# Patient Record
Sex: MALE | Race: WHITE | Employment: OTHER
[De-identification: names, ages, dates, MRNs, and addresses within clinical notes are randomized per-mention and may not be internally consistent; named-entity substitution may affect disease eponyms.]

---

## 2017-01-06 ENCOUNTER — CARE COORDINATION (OUTPATIENT)
Dept: CASE MANAGEMENT | Age: 82
End: 2017-01-06

## 2017-01-13 ENCOUNTER — CARE COORDINATION (OUTPATIENT)
Dept: CASE MANAGEMENT | Age: 82
End: 2017-01-13

## 2017-01-20 ENCOUNTER — CARE COORDINATION (OUTPATIENT)
Dept: CASE MANAGEMENT | Age: 82
End: 2017-01-20

## 2017-01-26 ENCOUNTER — CARE COORDINATION (OUTPATIENT)
Dept: CASE MANAGEMENT | Age: 82
End: 2017-01-26

## 2017-02-01 ENCOUNTER — OFFICE VISIT (OUTPATIENT)
Dept: ORTHOPEDIC SURGERY | Age: 82
End: 2017-02-01

## 2017-02-01 VITALS — BODY MASS INDEX: 24.5 KG/M2 | HEIGHT: 74 IN | WEIGHT: 190.92 LBS

## 2017-02-01 DIAGNOSIS — Z96.651 STATUS POST TOTAL RIGHT KNEE REPLACEMENT: Primary | ICD-10-CM

## 2017-02-01 PROCEDURE — 99024 POSTOP FOLLOW-UP VISIT: CPT | Performed by: ORTHOPAEDIC SURGERY

## 2017-02-06 ENCOUNTER — CARE COORDINATION (OUTPATIENT)
Dept: CASE MANAGEMENT | Age: 82
End: 2017-02-06

## 2017-02-22 ENCOUNTER — CARE COORDINATION (OUTPATIENT)
Dept: CASE MANAGEMENT | Age: 82
End: 2017-02-22

## 2018-02-15 ENCOUNTER — INITIAL CONSULT (OUTPATIENT)
Dept: SURGERY | Age: 83
End: 2018-02-15

## 2018-02-15 VITALS
SYSTOLIC BLOOD PRESSURE: 124 MMHG | WEIGHT: 187 LBS | DIASTOLIC BLOOD PRESSURE: 80 MMHG | HEIGHT: 74 IN | BODY MASS INDEX: 24 KG/M2

## 2018-02-15 DIAGNOSIS — K44.9 HIATAL HERNIA: Primary | ICD-10-CM

## 2018-02-15 DIAGNOSIS — R06.09 DYSPNEA ON EXERTION: ICD-10-CM

## 2018-02-15 PROCEDURE — 99203 OFFICE O/P NEW LOW 30 MIN: CPT | Performed by: SURGERY

## 2018-02-15 PROCEDURE — G8484 FLU IMMUNIZE NO ADMIN: HCPCS | Performed by: SURGERY

## 2018-02-15 PROCEDURE — G8420 CALC BMI NORM PARAMETERS: HCPCS | Performed by: SURGERY

## 2018-02-15 PROCEDURE — 1123F ACP DISCUSS/DSCN MKR DOCD: CPT | Performed by: SURGERY

## 2018-02-15 PROCEDURE — 4040F PNEUMOC VAC/ADMIN/RCVD: CPT | Performed by: SURGERY

## 2018-02-15 PROCEDURE — 1036F TOBACCO NON-USER: CPT | Performed by: SURGERY

## 2018-02-15 PROCEDURE — G8427 DOCREV CUR MEDS BY ELIG CLIN: HCPCS | Performed by: SURGERY

## 2018-02-15 NOTE — PROGRESS NOTES
intrinsic pulmonary factor rather than related to the hiatal hernia. Given his age, I think even repairing the hiatal hernia in a minimally invasive fashion would entail a very high risk to benefit ratio. Therefore, I would not recommend surgery at this time.

## 2018-04-16 ENCOUNTER — TELEPHONE (OUTPATIENT)
Dept: PULMONOLOGY | Age: 83
End: 2018-04-16

## 2018-04-20 ENCOUNTER — OFFICE VISIT (OUTPATIENT)
Dept: PULMONOLOGY | Age: 83
End: 2018-04-20

## 2018-04-20 VITALS
BODY MASS INDEX: 24.13 KG/M2 | RESPIRATION RATE: 24 BRPM | SYSTOLIC BLOOD PRESSURE: 150 MMHG | WEIGHT: 188 LBS | HEART RATE: 103 BPM | HEIGHT: 74 IN | OXYGEN SATURATION: 94 % | DIASTOLIC BLOOD PRESSURE: 94 MMHG

## 2018-04-20 DIAGNOSIS — I50.20 SYSTOLIC CONGESTIVE HEART FAILURE, UNSPECIFIED CONGESTIVE HEART FAILURE CHRONICITY: ICD-10-CM

## 2018-04-20 DIAGNOSIS — R06.02 SOB (SHORTNESS OF BREATH): Primary | ICD-10-CM

## 2018-04-20 DIAGNOSIS — K44.9 DIAPHRAGMATIC HERNIA WITHOUT OBSTRUCTION AND WITHOUT GANGRENE: ICD-10-CM

## 2018-04-20 PROCEDURE — G8420 CALC BMI NORM PARAMETERS: HCPCS | Performed by: INTERNAL MEDICINE

## 2018-04-20 PROCEDURE — 4040F PNEUMOC VAC/ADMIN/RCVD: CPT | Performed by: INTERNAL MEDICINE

## 2018-04-20 PROCEDURE — 1036F TOBACCO NON-USER: CPT | Performed by: INTERNAL MEDICINE

## 2018-04-20 PROCEDURE — 1123F ACP DISCUSS/DSCN MKR DOCD: CPT | Performed by: INTERNAL MEDICINE

## 2018-04-20 PROCEDURE — G8427 DOCREV CUR MEDS BY ELIG CLIN: HCPCS | Performed by: INTERNAL MEDICINE

## 2018-04-20 PROCEDURE — 99204 OFFICE O/P NEW MOD 45 MIN: CPT | Performed by: INTERNAL MEDICINE

## 2018-04-20 RX ORDER — FUROSEMIDE 20 MG/1
20 TABLET ORAL DAILY
Qty: 10 TABLET | Refills: 0 | Status: SHIPPED | OUTPATIENT
Start: 2018-04-20 | End: 2018-04-26 | Stop reason: SDUPTHER

## 2018-04-26 ENCOUNTER — OFFICE VISIT (OUTPATIENT)
Dept: CARDIOLOGY CLINIC | Age: 83
End: 2018-04-26

## 2018-04-26 VITALS
OXYGEN SATURATION: 96 % | BODY MASS INDEX: 23.93 KG/M2 | HEART RATE: 86 BPM | SYSTOLIC BLOOD PRESSURE: 138 MMHG | HEIGHT: 74 IN | WEIGHT: 186.5 LBS | DIASTOLIC BLOOD PRESSURE: 70 MMHG

## 2018-04-26 DIAGNOSIS — K44.9 DIAPHRAGMATIC HERNIA WITHOUT OBSTRUCTION AND WITHOUT GANGRENE: ICD-10-CM

## 2018-04-26 DIAGNOSIS — I10 ESSENTIAL HYPERTENSION: Chronic | ICD-10-CM

## 2018-04-26 DIAGNOSIS — I49.3 MULTIFOCAL PVCS: ICD-10-CM

## 2018-04-26 DIAGNOSIS — I50.42 CHRONIC COMBINED SYSTOLIC AND DIASTOLIC CONGESTIVE HEART FAILURE (HCC): Primary | ICD-10-CM

## 2018-04-26 DIAGNOSIS — I42.9 CARDIOMYOPATHY, UNSPECIFIED TYPE (HCC): ICD-10-CM

## 2018-04-26 PROCEDURE — G8427 DOCREV CUR MEDS BY ELIG CLIN: HCPCS | Performed by: INTERNAL MEDICINE

## 2018-04-26 PROCEDURE — G8420 CALC BMI NORM PARAMETERS: HCPCS | Performed by: INTERNAL MEDICINE

## 2018-04-26 PROCEDURE — 93000 ELECTROCARDIOGRAM COMPLETE: CPT | Performed by: INTERNAL MEDICINE

## 2018-04-26 PROCEDURE — 93225 XTRNL ECG REC<48 HRS REC: CPT | Performed by: INTERNAL MEDICINE

## 2018-04-26 PROCEDURE — 99205 OFFICE O/P NEW HI 60 MIN: CPT | Performed by: INTERNAL MEDICINE

## 2018-04-26 RX ORDER — LISINOPRIL 5 MG/1
5 TABLET ORAL DAILY
Qty: 30 TABLET | Refills: 5 | Status: SHIPPED | OUTPATIENT
Start: 2018-04-26 | End: 2018-07-06 | Stop reason: SDUPTHER

## 2018-04-26 RX ORDER — FUROSEMIDE 20 MG/1
20 TABLET ORAL 2 TIMES DAILY
Qty: 60 TABLET | Refills: 5 | Status: SHIPPED | OUTPATIENT
Start: 2018-04-26 | End: 2018-08-03 | Stop reason: SDUPTHER

## 2018-04-26 RX ORDER — METOPROLOL SUCCINATE 25 MG/1
25 TABLET, EXTENDED RELEASE ORAL DAILY
Qty: 30 TABLET | Refills: 5 | Status: ON HOLD | OUTPATIENT
Start: 2018-04-26 | End: 2018-06-09 | Stop reason: HOSPADM

## 2018-04-30 ENCOUNTER — TELEPHONE (OUTPATIENT)
Dept: CARDIOLOGY CLINIC | Age: 83
End: 2018-04-30

## 2018-05-07 ENCOUNTER — TELEPHONE (OUTPATIENT)
Dept: CARDIOLOGY CLINIC | Age: 83
End: 2018-05-07

## 2018-05-07 ENCOUNTER — HOSPITAL ENCOUNTER (OUTPATIENT)
Dept: NON INVASIVE DIAGNOSTICS | Age: 83
Discharge: HOME | End: 2018-05-07
Attending: INTERNAL MEDICINE | Admitting: INTERNAL MEDICINE

## 2018-05-07 VITALS — TEMPERATURE: 98.4 F

## 2018-05-07 DIAGNOSIS — I42.9 CARDIOMYOPATHY (HCC): ICD-10-CM

## 2018-05-07 LAB
LV EF: 27 %
LVEF MODALITY: NORMAL

## 2018-05-07 ASSESSMENT — PAIN - FUNCTIONAL ASSESSMENT
PAIN_FUNCTIONAL_ASSESSMENT: 0-10
PAIN_FUNCTIONAL_ASSESSMENT: 0-10

## 2018-05-10 PROCEDURE — 93227 XTRNL ECG REC<48 HR R&I: CPT | Performed by: INTERNAL MEDICINE

## 2018-05-11 ENCOUNTER — TELEPHONE (OUTPATIENT)
Dept: CARDIOLOGY CLINIC | Age: 83
End: 2018-05-11

## 2018-05-14 DIAGNOSIS — I49.3 MULTIFOCAL PVCS: ICD-10-CM

## 2018-05-31 ENCOUNTER — HOSPITAL ENCOUNTER (OUTPATIENT)
Dept: CT IMAGING | Age: 83
Discharge: HOME | End: 2018-05-31
Attending: INTERNAL MEDICINE | Admitting: INTERNAL MEDICINE

## 2018-05-31 ENCOUNTER — OFFICE VISIT (OUTPATIENT)
Dept: PULMONOLOGY | Age: 83
End: 2018-05-31

## 2018-05-31 VITALS
BODY MASS INDEX: 24 KG/M2 | RESPIRATION RATE: 24 BRPM | WEIGHT: 187 LBS | SYSTOLIC BLOOD PRESSURE: 153 MMHG | HEART RATE: 91 BPM | OXYGEN SATURATION: 93 % | DIASTOLIC BLOOD PRESSURE: 104 MMHG | HEIGHT: 74 IN

## 2018-05-31 DIAGNOSIS — J44.9 COPD, SEVERE (HCC): ICD-10-CM

## 2018-05-31 DIAGNOSIS — R06.02 SOB (SHORTNESS OF BREATH): Primary | ICD-10-CM

## 2018-05-31 DIAGNOSIS — R06.02 SOB (SHORTNESS OF BREATH): ICD-10-CM

## 2018-05-31 DIAGNOSIS — I25.5 ISCHEMIC CARDIOMYOPATHY: ICD-10-CM

## 2018-05-31 DIAGNOSIS — K44.9 HIATAL HERNIA: ICD-10-CM

## 2018-05-31 DIAGNOSIS — R06.02 SHORTNESS OF BREATH: ICD-10-CM

## 2018-05-31 PROCEDURE — 1036F TOBACCO NON-USER: CPT | Performed by: INTERNAL MEDICINE

## 2018-05-31 PROCEDURE — 3023F SPIROM DOC REV: CPT | Performed by: INTERNAL MEDICINE

## 2018-05-31 PROCEDURE — 99214 OFFICE O/P EST MOD 30 MIN: CPT | Performed by: INTERNAL MEDICINE

## 2018-05-31 PROCEDURE — 4040F PNEUMOC VAC/ADMIN/RCVD: CPT | Performed by: INTERNAL MEDICINE

## 2018-05-31 PROCEDURE — G8420 CALC BMI NORM PARAMETERS: HCPCS | Performed by: INTERNAL MEDICINE

## 2018-05-31 PROCEDURE — G8598 ASA/ANTIPLAT THER USED: HCPCS | Performed by: INTERNAL MEDICINE

## 2018-05-31 PROCEDURE — 1123F ACP DISCUSS/DSCN MKR DOCD: CPT | Performed by: INTERNAL MEDICINE

## 2018-05-31 PROCEDURE — G8926 SPIRO NO PERF OR DOC: HCPCS | Performed by: INTERNAL MEDICINE

## 2018-05-31 PROCEDURE — G8427 DOCREV CUR MEDS BY ELIG CLIN: HCPCS | Performed by: INTERNAL MEDICINE

## 2018-06-01 ENCOUNTER — TELEPHONE (OUTPATIENT)
Dept: PULMONOLOGY | Age: 83
End: 2018-06-01

## 2018-06-04 ENCOUNTER — TELEPHONE (OUTPATIENT)
Dept: CARDIOLOGY CLINIC | Age: 83
End: 2018-06-04

## 2018-06-08 PROBLEM — I42.9 MYOCARDIOPATHY (HCC): Status: ACTIVE | Noted: 2018-06-08

## 2018-06-09 PROBLEM — I25.5 ISCHEMIC CARDIOMYOPATHY: Status: ACTIVE | Noted: 2018-06-09

## 2018-07-06 ENCOUNTER — OFFICE VISIT (OUTPATIENT)
Dept: CARDIOLOGY CLINIC | Age: 83
End: 2018-07-06

## 2018-07-06 VITALS
DIASTOLIC BLOOD PRESSURE: 82 MMHG | WEIGHT: 181.2 LBS | BODY MASS INDEX: 23.25 KG/M2 | HEIGHT: 74 IN | OXYGEN SATURATION: 90 % | SYSTOLIC BLOOD PRESSURE: 130 MMHG | HEART RATE: 64 BPM

## 2018-07-06 DIAGNOSIS — I49.3 MULTIFOCAL PVCS: ICD-10-CM

## 2018-07-06 DIAGNOSIS — I10 ESSENTIAL HYPERTENSION: Chronic | ICD-10-CM

## 2018-07-06 DIAGNOSIS — I50.42 CHRONIC COMBINED SYSTOLIC AND DIASTOLIC CONGESTIVE HEART FAILURE (HCC): Primary | ICD-10-CM

## 2018-07-06 DIAGNOSIS — I25.5 ISCHEMIC CARDIOMYOPATHY: ICD-10-CM

## 2018-07-06 DIAGNOSIS — K44.9 DIAPHRAGMATIC HERNIA WITHOUT OBSTRUCTION AND WITHOUT GANGRENE: ICD-10-CM

## 2018-07-06 DIAGNOSIS — K44.9 HIATAL HERNIA: ICD-10-CM

## 2018-07-06 PROCEDURE — 1036F TOBACCO NON-USER: CPT | Performed by: INTERNAL MEDICINE

## 2018-07-06 PROCEDURE — G8420 CALC BMI NORM PARAMETERS: HCPCS | Performed by: INTERNAL MEDICINE

## 2018-07-06 PROCEDURE — 4040F PNEUMOC VAC/ADMIN/RCVD: CPT | Performed by: INTERNAL MEDICINE

## 2018-07-06 PROCEDURE — G8598 ASA/ANTIPLAT THER USED: HCPCS | Performed by: INTERNAL MEDICINE

## 2018-07-06 PROCEDURE — G8427 DOCREV CUR MEDS BY ELIG CLIN: HCPCS | Performed by: INTERNAL MEDICINE

## 2018-07-06 PROCEDURE — 99214 OFFICE O/P EST MOD 30 MIN: CPT | Performed by: INTERNAL MEDICINE

## 2018-07-06 PROCEDURE — 1123F ACP DISCUSS/DSCN MKR DOCD: CPT | Performed by: INTERNAL MEDICINE

## 2018-07-06 RX ORDER — LISINOPRIL 10 MG/1
10 TABLET ORAL DAILY
Qty: 30 TABLET | Refills: 5 | Status: SHIPPED | OUTPATIENT
Start: 2018-07-06 | End: 2018-08-03 | Stop reason: SDUPTHER

## 2018-07-06 NOTE — PROGRESS NOTES
Section of Cardiology                                     Cardiovascular Evaluation      PATIENT: Yuli Torres  DATE: 2018  MRN: [id number]  CSN: [id number]  : 9/15/1927    Primary Care Doctor: Jailyn Huizar MD    Reason for evaluation:   Follow-Up from Hospital (Cath 18); Hypertension; Cardiomyopathy; and Coronary Artery Disease    History of present illness:  Yuli Torres is a 80 y.o. patient who presents for evaluation of shortness of breath, PVC's, cardiomyopathy, HTN. The shortness of breath is not new, started 2 years ago, but has worsened over the last 2-3 months. It is worse with exertion. Denied PND, orthopnea and leg swelling. Echo at [facility] 2018 showed EF of 35-40%, mild pulmonary HTN. Denies chest pain, edema, dizziness, palpitations and syncope. Today he presents for follow up of CHF, CM, PVC, HTN, CAD. He underwent stress test which was abnormal and then underwent cardiac and PCI of distal LAD for abnormal FFR with Xience Alpine drug-eluting stent. He also had high-grade disease of ostial Ramus, ostial Diagonal 1, Diagonal 2 and Diagonal 3 branches. His dyspnea has mildly improved after the PCI and on Lasix. He denied leg swelling. Denies chest pain, dizziness, palpitations and syncope. Past Medical History:   has a past medical history of Acid reflux; Arthritis; Hypertension; Neuropathy ([address] and Prostate cancer ([facility] [address] Surgical History:   has a past surgical history that includes hernia repair (); Total knee arthroplasty (Left, 3/21/16); joint replacement; knee surgery; and Total knee arthroplasty (Right, 2016). Social History:   reports that he quit smoking about 68 years ago. His smoking use included Pipe. He quit after 5.00 years of use. He has never used smokeless tobacco. He reports that he drinks alcohol. He reports that he does not use drugs.      Family History:  family history includes Heart Disease in his mitral and pulmonic regurgitation. Moderate bi-atrial enlargement. The right ventricle is mildly enlarged. Right ventricular systolic function is mildly reduced . Moderate tricuspid regurgitation. Systolic pulmonic artery pressure (SPAP) is estimated at 53 mmHg consistent with mild pulmonary hypertension (Right atrial pressure of 3mmHg). The pulmonic valve is not well visualized. CT Chest 5/31/18  There is a large hiatal hernia containing the entire stomach as well as a portion transverse colon and pancreas. This causes mass effect on the left lower lung with partial collapse of the left lower lobe. There is a trace left pleural effusion and mild left basilar airspace disease. While this is likely related to mass effect from the hiatal hernia, mild pneumonia is not excluded. Clinical correlation recommended. Stress test 5/7/18  Abnormal high risk myocardial perfusion study. There is a small area of ischemia within the posterior-lateral segment. There is an additional area of possible ischemia within the basal anterior  wall. The estimated left ventricular function is 27%. The left ventricular size is moderately dilated. Cardiac Cath 6/8/18  1. Successful PCI of distal LAD with Xience Alpine drug-eluting stent. 2.  High-grade disease of ostial Ramus, ostial Diagonal 1, Diagonal 2 and  Diagonal 3 branches. 3.  Normal left ventricular filling pressure. 4.  Mildly elevated right-sided filling pressures. 5.  PA, PCWP, and PVR suggest that the pulmonary hypertension is due to  pulmonary disease. 6.  Mildly reduced cardiac index. 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  His left ventricular filling pressures were normal on cath which suggests that his diaphragmatic hernia is playing a  role. If he wants surgery, can be done 6 months post-PCI when the Plavix can be held as it was low risk PCI. I instructed him to start taking the Lasix. I also educated him about daily weight, 1.5 Liter fluid and  2 gm Na restriction. I instructed him to take extra dose of Lasix for weight gain of 2 lbs. - I will increase his Lisinopril 10 mg daily with BMP in 5 days. He will continue Toprol. - Will refer him to EP to assess his PVC. - He will continue moderate dose Lipitor, LDL in June was at goal (< 70). - Follow up with Yvetta Essex, NP in CHF clinic at [facility] office in 4 weeks. If you have questions, please do not hesitate to call me. I look forward to following Trung Mead along with you.       John Samano MD, [facility] [place], [place]  [phone] Dennie Hockey office  [phone] Main central  7/6/2018 12:45 PM

## 2018-07-06 NOTE — PATIENT INSTRUCTIONS
Recommendation:  - His dyspnea has mildly improved. His left ventricular filling pressures were normal on cath which suggests that his diaphragmatic hernia is playing a  role. If he wants surgery, can be done 6 months post-PCI when the Plavix can be held as it was low risk PCI. I instructed him to start taking the Lasix. I also educated him about daily weight, 1.5 Liter fluid and  2 gm Na restriction. I instructed him to take extra dose of Lasix for weight gain of 2 lbs. - I will increase his Lisinopril 10 mg daily with BMP in 5 days. He will continue Toprol. - Will refer him to EP to assess his PVC. - He will continue moderate dose Lipitor, LDL in June was at goal (< 70). - Follow up with Roseline Mackey NP in CHF clinic at [facility] in 4 weeks.

## 2018-07-06 NOTE — LETTER
[address] Cardiology [facility] [address] [address]  Phone: [phone]  Fax: [phone]    Logan Murcia MD        July 9, 2018     Wendie Pierce MD  [place] [id number]    Patient: Thelma Lam  MR Number: [id number]  YOB: 1927  Date of Visit: 7/6/2018    Dear Dr. Wendie Pierce:    Thank you for the request for consultation for Marzena Jacobo to me for the evaluation. Below are the relevant portions of my assessment and plan of care. 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 His left ventricular filling pressures were normal on cath which suggests that his diaphragmatic hernia is playing a  role. If he wants surgery, can be done 6 months post-PCI when the Plavix can be held as it was low risk PCI. I instructed him to start taking the Lasix. I also educated him about daily weight, 1.5 Liter fluid and  2 gm Na restriction. I instructed him to take extra dose of Lasix for weight gain of 2 lbs. - I will increase his Lisinopril 10 mg daily with BMP in 5 days. He will continue Toprol. - Will refer him to EP to assess his PVC. - He will continue moderate dose Lipitor, LDL in June was at goal (< 70). - Follow up with Kira Kasper NP in CHF clinic at [facility] in 4 weeks.        If you have questions, please do not hesitate to call me. I look forward to following Thelma Lam along with you.     If you have questions, please do not hesitate to call me. I look forward to following Ely Cerda along with you.     Sincerely,        Logan Murcia MD

## 2018-07-20 ENCOUNTER — OFFICE VISIT (OUTPATIENT)
Dept: CARDIOLOGY CLINIC | Age: 83
End: 2018-07-20

## 2018-07-20 VITALS
HEART RATE: 74 BPM | WEIGHT: 180 LBS | DIASTOLIC BLOOD PRESSURE: 76 MMHG | BODY MASS INDEX: 23.86 KG/M2 | SYSTOLIC BLOOD PRESSURE: 124 MMHG | HEIGHT: 73 IN

## 2018-07-20 DIAGNOSIS — I49.3 PVC'S (PREMATURE VENTRICULAR CONTRACTIONS): ICD-10-CM

## 2018-07-20 DIAGNOSIS — R53.83 FATIGUE, UNSPECIFIED TYPE: ICD-10-CM

## 2018-07-20 DIAGNOSIS — I25.5 ISCHEMIC CARDIOMYOPATHY: Primary | ICD-10-CM

## 2018-07-20 PROCEDURE — G8420 CALC BMI NORM PARAMETERS: HCPCS | Performed by: INTERNAL MEDICINE

## 2018-07-20 PROCEDURE — 99214 OFFICE O/P EST MOD 30 MIN: CPT | Performed by: INTERNAL MEDICINE

## 2018-07-20 PROCEDURE — 1101F PT FALLS ASSESS-DOCD LE1/YR: CPT | Performed by: INTERNAL MEDICINE

## 2018-07-20 PROCEDURE — 93000 ELECTROCARDIOGRAM COMPLETE: CPT | Performed by: INTERNAL MEDICINE

## 2018-07-20 PROCEDURE — G8427 DOCREV CUR MEDS BY ELIG CLIN: HCPCS | Performed by: INTERNAL MEDICINE

## 2018-07-20 RX ORDER — MAGNESIUM OXIDE 400 MG/1
400 TABLET ORAL DAILY
Qty: 30 TABLET | Refills: 5 | Status: SHIPPED | OUTPATIENT
Start: 2018-07-20 | End: 2018-08-03 | Stop reason: SDUPTHER

## 2018-07-20 NOTE — PATIENT INSTRUCTIONS
RECOMMENDATIONS:  1. Referral to Dr. Jacki Donis for sleep apnea evaluation. 2. Start Magnesium 400 mg once daily. 3. Stop Torpol due to fatigue. 4. Start Verapamil 120 mg daily. 5. Follow up with Yudy Puri CNP in 8 weeks.

## 2018-07-20 NOTE — PROGRESS NOTES
and full  Abdomen:  · No masses or tenderness  · Bowel sounds present  Extremities:  ·  No Cyanosis or Clubbing  ·  Lower extremity edema: No  · Skin: Warm and dry  Neurological:  · Alert and oriented. · Moves all extremities well  · No abnormalities of mood, affect, memory, mentation, or behavior are noted      DATA:    ECG:  normal EKG, normal sinus rhythm, unchanged from previous tracings. ECHO: 6/9/2018  Summary   The left ventricular systolic function is moderately reduced with an   ejection fraction of 30 -35 %. There is hypokinesis of the inferolateral, anterior and inferior walls. There is akinesis of the basal anterior and mid inferoseptum walls. Mild concentric left ventricular hypertrophy. Left ventricular diastolic filling pressure is elevated. Mild mitral and pulmonic regurgitation. Moderate bi-atrial enlargement. The right ventricle is mildly enlarged. Right ventricular systolic function is mildly reduced . Moderate tricuspid regurgitation. Systolic pulmonic artery pressure (SPAP) is estimated at 53 mmHg consistent   with mild pulmonary hypertension (Right atrial pressure of 3mmHg). The pulmonic valve is not well visualized. IMPRESSION:    1. Ischemic cardiomyopathy    2. PVC's (premature ventricular contractions)    3. Fatigue, unspecified type        RECOMMENDATIONS:  1. Referral to Dr. Wells Bachelor for sleep apnea evaluation. 2. Start Magnesium 400 mg once daily. 3. Continue Torpol. 4. Follow up with Cynthia Frye CNP in 8 weeks. QUALITY MEASURES  1. Tobacco Cessation Counseling: NA  2. Retake of BP if >140/90:   NA  3. Documentation to PCP/referring for new patient:  Sent to PCP at close of office visit  4. CAD patient on anti-platelet: Yes  5. CAD patient on STATIN therapy:  Yes  6. Patient with CHF and aFib on anticoagulation:  NA       All questions and concerns were addressed to the patient/family. Alternatives to my treatment were discussed. OVIDIO Riojas,

## 2018-07-20 NOTE — LETTER
[address] Cardiology [facility] [address]  Phone: [phone]  Fax: [phone]    Cristi Harris MD        July 24, 2018     James Phalen, MD  [facility]    Patient: Millicent Cantor  MR Number: [id number]  YOB: 1927  Date of Visit: 7/20/2018    Dear Dr. James Phalen:    I recently saw our mutual patient, listed above. Below are the relevant portions of my assessment and plan of care. [address]   Electrophysiology Consult Note              Date:  July 20, 2018  Patient name: Millicent Cantor  YOB: 1927    Reason for Consult:  Premature ventricular contractions Irregular Heart Beat    Requesting Physician: Dr. Grant Jarrett. HISTORY OF PRESENT ILLNESS: Millicent Cantor is a 80 y.o. male who presents for evaluation for palpitations. He underwent a cardiac cath on 6/8/2018 in the setting of an abnormal stress test. This resulted in a PCI of the distal LAD. He also has a history of CM, HTN and PVCs. He wore a 48 hour holter monitor that showed a high burden of PACs and PVCs. His EKG from today shows sinus rhythm with frequent premature beats. He reports he has extreme fatigue and shortness of breath. He struggles to complete simple tasks without stopping to rest. He feels his symptom of shortness of breath is stable though his fatigue is worsening. He has never been tested for sleep apnea. His echocardiogram from 6/9/2018 showed enlargement of his left upper and right lower chambers, consistent with sleep apnea. Patient currently denies any weight gain, edema, palpitations, chest pain, dizziness, and syncope. Past Medical History:   has a past medical history of Acid reflux; Arthritis; Hypertension; Neuropathy ([address] and Prostate cancer ([facility] [address]     Past Surgical History:   has a past surgical history that includes hernia repair (2010); Total knee arthroplasty (Left, 3/21/16); joint replacement; knee surgery; and Total knee arthroplasty (Right, 11/25/2016). Allergies:  Ciprofloxacin    Social History:   reports that he quit smoking about 68 years ago. His smoking use included Pipe. He quit after 5.00 years of use. He has never used smokeless tobacco. He reports that he drinks alcohol. He reports that he does not use drugs. Family History: family history includes Heart Disease in his brother; Other in his brother; Pacemaker in his brother. Home Medications:    Prior to Admission medications    Medication Sig Start Date End Date Taking? Authorizing Provider   lisinopril (PRINIVIL;ZESTRIL) 10 MG tablet Take 1 tablet by mouth daily 7/6/18  Yes Chantal Burt MD   nitroGLYCERIN (NITROSTAT) 0.4 MG SL tablet Place 1 tablet under the tongue every 5 minutes as needed for Chest pain 6/10/18  Yes Calvin Montano MD   metoprolol succinate (TOPROL XL) 50 MG extended release tablet Take 1 tablet by mouth daily 6/10/18  Yes PEYMAN Chan CNP   aspirin 81 MG chewable tablet Take 1 tablet by mouth daily 6/10/18  Yes PEYMAN Chan CNP   atorvastatin (LIPITOR) 40 MG tablet Take 1 tablet by mouth nightly 6/9/18  Yes PEYMAN Chan CNP   clopidogrel (PLAVIX) 75 MG tablet Take 1 tablet by mouth daily 6/10/18  Yes PEYMAN Chan CNP   furosemide (LASIX) 20 MG tablet Take 1 tablet by mouth 2 times daily 4/26/18  Yes Chantal Burt MD   esomeprazole Magnesium (NEXIUM) 20 MG PACK Take 20 mg by mouth daily Pt takes every other day   Yes Historical Provider, MD          REVIEW OF SYSTEMS:      All 14-point review of systems are completed and  pertinent positives are mentioned in the history of present illness. Other  systems are reviewed and are negative.     Physical Examination:    Ht 6' 1\" (1.854 m)   Wt 180 lb (81.6 kg)   BMI 23.75 kg/m²       Constitutional and General Appearance: 1. Referral to Dr. Alexandria Jay for sleep apnea evaluation. 2. Start Magnesium 400 mg once daily. 3. Continue Torpol. 4. Follow up with Phuong Velasquez CNP in 8 weeks. QUALITY MEASURES  1. Tobacco Cessation Counseling: NA  2. Retake of BP if >140/90:   NA  3. Documentation to PCP/referring for new patient:  Sent to PCP at close of office visit  4. CAD patient on anti-platelet: Yes  5. CAD patient on STATIN therapy:  Yes  6. Patient with CHF and aFib on anticoagulation:  NA       All questions and concerns were addressed to the patient/family. Alternatives to my treatment were discussed. OVIDIO Moore F.A.C.C. Electrophysiology  [facility]. [address]. [place], [address]  Phone: [phone]  Fax: [phone]            If you have questions, please do not hesitate to call me. I look forward to following Tyree Simpson along with you.     Sincerely,        Ranjith Craft MD

## 2018-07-23 ENCOUNTER — TELEPHONE (OUTPATIENT)
Dept: CARDIOLOGY CLINIC | Age: 83
End: 2018-07-23

## 2018-07-23 RX ORDER — METOPROLOL SUCCINATE 50 MG/1
50 TABLET, EXTENDED RELEASE ORAL DAILY
Qty: 30 TABLET | Refills: 5 | Status: SHIPPED | OUTPATIENT
Start: 2018-07-23 | End: 2018-08-03 | Stop reason: SDUPTHER

## 2018-07-23 NOTE — TELEPHONE ENCOUNTER
----- Message from Dion Lopez MD sent at 7/22/2018 10:05 PM EDT -----  Have him stop Verapamil. His EF is very low and Verapamil is not good for his cardiomyopathy.  Resume Toprol

## 2018-07-23 NOTE — TELEPHONE ENCOUNTER
Letty Phan RN          7/23/18 8:41 AM   Note      ----- Message from Drew Jenkins MD sent at 7/22/2018 10:05 PM EDT -----  Have him stop Verapamil. His EF is very low and Verapamil is not good for his cardiomyopathy.  Resume Toprol

## 2018-07-25 ENCOUNTER — OFFICE VISIT (OUTPATIENT)
Dept: PULMONOLOGY | Age: 83
End: 2018-07-25

## 2018-07-25 VITALS
TEMPERATURE: 97.3 F | HEART RATE: 78 BPM | BODY MASS INDEX: 23.59 KG/M2 | SYSTOLIC BLOOD PRESSURE: 134 MMHG | RESPIRATION RATE: 18 BRPM | DIASTOLIC BLOOD PRESSURE: 70 MMHG | HEIGHT: 73 IN | OXYGEN SATURATION: 94 % | WEIGHT: 178 LBS

## 2018-07-25 DIAGNOSIS — R29.818 SUSPECTED SLEEP APNEA: ICD-10-CM

## 2018-07-25 DIAGNOSIS — R06.83 SNORING: Primary | ICD-10-CM

## 2018-07-25 DIAGNOSIS — R53.83 FATIGUE, UNSPECIFIED TYPE: ICD-10-CM

## 2018-07-25 PROCEDURE — G8427 DOCREV CUR MEDS BY ELIG CLIN: HCPCS | Performed by: NURSE PRACTITIONER

## 2018-07-25 PROCEDURE — 4040F PNEUMOC VAC/ADMIN/RCVD: CPT | Performed by: NURSE PRACTITIONER

## 2018-07-25 PROCEDURE — G8598 ASA/ANTIPLAT THER USED: HCPCS | Performed by: NURSE PRACTITIONER

## 2018-07-25 PROCEDURE — 1123F ACP DISCUSS/DSCN MKR DOCD: CPT | Performed by: NURSE PRACTITIONER

## 2018-07-25 PROCEDURE — G8420 CALC BMI NORM PARAMETERS: HCPCS | Performed by: NURSE PRACTITIONER

## 2018-07-25 PROCEDURE — 1036F TOBACCO NON-USER: CPT | Performed by: NURSE PRACTITIONER

## 2018-07-25 PROCEDURE — 99203 OFFICE O/P NEW LOW 30 MIN: CPT | Performed by: NURSE PRACTITIONER

## 2018-07-25 PROCEDURE — 1101F PT FALLS ASSESS-DOCD LE1/YR: CPT | Performed by: NURSE PRACTITIONER

## 2018-07-25 ASSESSMENT — SLEEP AND FATIGUE QUESTIONNAIRES
HOW LIKELY ARE YOU TO NOD OFF OR FALL ASLEEP IN A CAR, WHILE STOPPED FOR A FEW MINUTES IN TRAFFIC: 0
HOW LIKELY ARE YOU TO NOD OFF OR FALL ASLEEP WHILE SITTING AND TALKING TO SOMEONE: 0
HOW LIKELY ARE YOU TO NOD OFF OR FALL ASLEEP WHILE SITTING QUIETLY AFTER LUNCH WITHOUT ALCOHOL: 0
HOW LIKELY ARE YOU TO NOD OFF OR FALL ASLEEP WHEN YOU ARE A PASSENGER IN A CAR FOR AN HOUR WITHOUT A BREAK: 2
HOW LIKELY ARE YOU TO NOD OFF OR FALL ASLEEP WHILE SITTING INACTIVE IN A PUBLIC PLACE: 1
HOW LIKELY ARE YOU TO NOD OFF OR FALL ASLEEP WHILE SITTING AND READING: 2
NECK CIRCUMFERENCE (INCHES): 16.25
ESS TOTAL SCORE: 7
HOW LIKELY ARE YOU TO NOD OFF OR FALL ASLEEP WHILE WATCHING TV: 2
HOW LIKELY ARE YOU TO NOD OFF OR FALL ASLEEP WHILE LYING DOWN TO REST IN THE AFTERNOON WHEN CIRCUMSTANCES PERMIT: 0

## 2018-07-25 NOTE — PROGRESS NOTES
[facility]                                                                  CHIEF COMPLAINT: snoring. Fatigue    Patient is being seen at the request of Dr. Merly Crane for a consultation for sleep/snoring. Underwent cardiac cath 6/8/2018 in the setting of abnormal stress test, resulted in PCI of the distal LAD. During follow-up with cardiology reported extreme fatigue and shortness of breath. Echocardiogram from 6/9/2018 showed enlargement of his left upper and right lower chambers, consistent with sleep apnea, therefore referred by Dr. Carlos Nick for evaluation of LESTER. Accompanied by son to visit. HPI:   Previously used to snore at night but unsure recently. Has witnessed apnea. Does not wake up at night choking and gasping for air. Intermittent restorative sleep. No dry mouth upon awakening. Fatigue and tiredness during the day. Bedtime 830-930 pm and rise time is 5 am. Sleep onset<30 minutes. No TV in bedroom. 3-4 nocturia. Wakes up 3-4 times at night. It takes him a few minutes to fall back a sleep. 2-3 naps during the day for 20-60 minutes. Rare headache in am. No driving due poor eye sight. No weight gain. +mild forgetfulness or decreased concentration. No nasal congestion at night. Drinks 1-2 caffinated beverages per day. Rare alcohol. No restless feelings in legs at night. ESS is 7. No smoking. No FH for LESTER, RLS or narcolepsy.      Sleep Medicine 7/25/2018   Sitting and reading 2   Watching TV 2   Sitting, inactive in a public place (e.g. a theatre or a meeting) 1   As a passenger in a car for an hour without a break 2   Lying down to rest in the afternoon when circumstances permit 0   Sitting and talking to someone 0   Sitting quietly after a lunch without alcohol 0   In a car, while stopped for a few minutes in traffic 0   Total score 7   Neck circumference 16.25       Past Medical History:   Diagnosis Date    Acid reflux     Arthritis     Hypertension     Neuropathy ([address]     Prostate cancer [facility])     prostate       Past Surgical History:        Procedure Laterality Date    HERNIA REPAIR  2010    JOINT REPLACEMENT      KNEE SURGERY      TOTAL KNEE ARTHROPLASTY Left 3/21/16    TOTAL KNEE ARTHROPLASTY Right 11/25/2016       Allergies:  is allergic to ciprofloxacin. Social History:    TOBACCO:   reports that he quit smoking about 68 years ago. His smoking use included Pipe. He quit after 5.00 years of use. He has never used smokeless tobacco.  ETOH:   reports that he drinks alcohol.       Family History:   Family History   Problem Relation Age of Onset    Other Brother         aorta replacement - heart failure    Pacemaker Brother     Heart Disease Brother        Current Medications:    Current Outpatient Prescriptions:     metoprolol succinate (TOPROL XL) 50 MG extended release tablet, Take 1 tablet by mouth daily, Disp: 30 tablet, Rfl: 5    magnesium oxide (MAG-OX) 400 MG tablet, Take 1 tablet by mouth daily, Disp: 30 tablet, Rfl: 5    lisinopril (PRINIVIL;ZESTRIL) 10 MG tablet, Take 1 tablet by mouth daily, Disp: 30 tablet, Rfl: 5    nitroGLYCERIN (NITROSTAT) 0.4 MG SL tablet, Place 1 tablet under the tongue every 5 minutes as needed for Chest pain, Disp: 25 tablet, Rfl: 3    aspirin 81 MG chewable tablet, Take 1 tablet by mouth daily, Disp: 30 tablet, Rfl: 11    atorvastatin (LIPITOR) 40 MG tablet, Take 1 tablet by mouth nightly, Disp: 30 tablet, Rfl: 11    clopidogrel (PLAVIX) 75 MG tablet, Take 1 tablet by mouth daily, Disp: 30 tablet, Rfl: 11    furosemide (LASIX) 20 MG tablet, Take 1 tablet by mouth 2 times daily, Disp: 60 tablet, Rfl: 5    esomeprazole Magnesium (NEXIUM) 20 MG PACK, Take 20 mg by mouth daily Pt takes every other day, Disp: , Rfl:       REVIEW OF SYSTEMS:  Constitutional: Negative for fever  HENT: Negative for sore throat  Eyes: Negative for redness   Respiratory: +for dyspnea, cough  Cardiovascular: Negative for chest pain  Gastrointestinal: Negative for vomiting, diarrhea   Genitourinary: Negative for hematuria   Musculoskeletal: Negative for arthralgias   Skin: Negative for rash  Neurological: Negative for syncope  Hematological: Negative for adenopathy  Psychiatric/Behavorial: Negative for anxiety      Objective:   PHYSICAL EXAM:    Blood pressure 134/70, pulse 78, temperature 97.3 °F (36.3 °C), temperature source Oral, resp. rate 18, height 6' 1\" (1.854 m), weight 178 lb (80.7 kg), SpO2 94 %.' on RA  Gen: No distress. BMI of 23.48. Using Cane w/ambulation  Eyes: PERRL. No sclera icterus. No conjunctival injection. ENT: No discharge. Pharynx clear. Mallampati class II. Large tongue with ridging. Tonsillar hypertrophy. Neck: Trachea midline. No obvious mass. Neck circumference 16.25\"  Resp: No accessory muscle use. No crackles. No wheezes. No rhonchi. Good air entry. CV: Regular rate. Regular rhythm. No murmur or rub. No edema. GI: Non-tender. Non-distended. +hernia. Skin: Warm and dry. No nodule on exposed extremities. Lymph: No cervical LAD. No supraclavicular LAD. M/S: No cyanosis. No joint deformity. No clubbing. Neuro: Awake. Alert. Moves all four extremities. Psych: Oriented x 3. No anxiety.          DATA reviewed by me:   ECHO 6/9/18: EF 30-35%    ECHO 6/9/18:    The left ventricular systolic function is moderately reduced with an   ejection fraction of 30 -35 %.   There is hypokinesis of the inferolateral, anterior and inferior walls.   There is akinesis of the basal anterior and mid inferoseptum walls.   Mild concentric left ventricular hypertrophy.   Left ventricular diastolic filling pressure is elevated.   Mild mitral and pulmonic regurgitation.   Moderate bi-atrial enlargement.   The right ventricle is mildly enlarged.   Right ventricular systolic function is mildly reduced .   Moderate tricuspid regurgitation.   Systolic pulmonic artery pressure (SPAP) is estimated at 53 mmHg consistent   with mild pulmonary hypertension (Right atrial pressure of 3mmHg).   The pulmonic valve is not well visualized. Assessment:       · Snoring  · Suspected sleep apnea - Given echocardiogram results showing enlargement of left upper and right lower chambers, history, and Stop Bang Questionnaire: High Risk for Obstructive Sleep Apnea (LESTER)  · Fatigue   · Severe COPD and shortness of breathfollowed by Dr. Joan Jain   · Comorbid conditions: HTN, Cardiomyopathy, Combined diastolic and systolic CHF - [facility] by [facility]      Plan:       · PSG evaluate for sleep related breathing disorder. Patient contemplating location [facility] vs [facility] sleep lab  · Treatment options were discussed with patient if PSG reveals LESTER, including CPAP therapy, oral appliances, upper airway surgery and hypoglossal nerve stimulation. · Discussed with patient the pathophysiology of apnea. · Sleep hygiene discussed and provided written education in AVS  · Avoid sedatives, alcohol and caffeinated drinks at bed time. · Does not drive. · Complications of LESTER if not treated were discussed with patient patient to include systemic hypertension, pulmonary hypertension, cardiovascular morbidities, car accidents and all cause mortality. · Follow up with Dr. Joan Jain - to discuss possible inhaler regimen. Reports he used Anora for 2 weeks with no benefit.

## 2018-07-25 NOTE — PATIENT INSTRUCTIONS
Uncomfortable bedding can prevent good sleep. Ensure your bedroom is quiet and comfortable. A cooler room along with enough blankets to stay warm is recommended. If your room is too noisy, try a white noise machine. If too bright, try black out shades or an eye mask. Dont take worries to bed. Leave worries about work, school etc. behind you when you go to bed. Some people find it helpful to assign a worry period in the evening or late afternoon to write down your worries and get them out of your system. [facility] at [address] [facility], [address] [place] [address]                      Phone: [phone] Fax: [phone]      Your appointment for a sleep study is scheduled on 8/6/18 at 8:30pm. Please arrive at the [facility] at the time indicated. On Saturday and Sunday night a sleep tech will come down to let you in the building and escort you upstairs to the sleep center; please call from the parking lot if no one is at the door when you arrive. PLEASE DO NOT ARRIVE TO THE SLEEP CENTER ANY EARLIER THAN 8:30PM AS THERE IS NO STAFF ON DUTY AND THE DOORS WILL BE LOCKED    IMPORTANT: We ask that you please phone the [facility] Patient Pre-Services ([phone]) at least 3-5 days prior to your sleep study to pre-register. Failing to pre-register may ultimately cause your insurance to not pay for this procedure. Please be aware that [facility] is a non-smoking facility. There is no smoking allowed anywhere on [facility] property at any time. Each patient room is a private room with cable television, WiFi and a private bathroom with shower facilities. The test itself consists of electrodes and sensors attached to specific areas of your scalp, face, chest and legs. We will also monitor respiratory effort, nasal and oral airflow and oxygen levels. The test will not hurt; it is completely painless and not invasive in any way.      Please bring with

## 2018-07-31 ENCOUNTER — TELEPHONE (OUTPATIENT)
Dept: PULMONOLOGY | Age: 83
End: 2018-07-31

## 2018-08-03 ENCOUNTER — OFFICE VISIT (OUTPATIENT)
Dept: CARDIOLOGY CLINIC | Age: 83
End: 2018-08-03

## 2018-08-03 VITALS
SYSTOLIC BLOOD PRESSURE: 144 MMHG | DIASTOLIC BLOOD PRESSURE: 74 MMHG | BODY MASS INDEX: 23.59 KG/M2 | HEIGHT: 73 IN | HEART RATE: 50 BPM | OXYGEN SATURATION: 94 % | WEIGHT: 178 LBS

## 2018-08-03 DIAGNOSIS — I10 ESSENTIAL HYPERTENSION: Chronic | ICD-10-CM

## 2018-08-03 DIAGNOSIS — I50.42 CHRONIC COMBINED SYSTOLIC AND DIASTOLIC CONGESTIVE HEART FAILURE (HCC): ICD-10-CM

## 2018-08-03 DIAGNOSIS — I25.5 ISCHEMIC CARDIOMYOPATHY: Primary | ICD-10-CM

## 2018-08-03 DIAGNOSIS — I25.10 CORONARY ARTERY DISEASE INVOLVING NATIVE CORONARY ARTERY OF NATIVE HEART WITHOUT ANGINA PECTORIS: ICD-10-CM

## 2018-08-03 DIAGNOSIS — J44.9 COPD, SEVERE (HCC): ICD-10-CM

## 2018-08-03 PROCEDURE — G8598 ASA/ANTIPLAT THER USED: HCPCS | Performed by: NURSE PRACTITIONER

## 2018-08-03 PROCEDURE — G8926 SPIRO NO PERF OR DOC: HCPCS | Performed by: NURSE PRACTITIONER

## 2018-08-03 PROCEDURE — 3023F SPIROM DOC REV: CPT | Performed by: NURSE PRACTITIONER

## 2018-08-03 PROCEDURE — 4040F PNEUMOC VAC/ADMIN/RCVD: CPT | Performed by: NURSE PRACTITIONER

## 2018-08-03 PROCEDURE — 1101F PT FALLS ASSESS-DOCD LE1/YR: CPT | Performed by: NURSE PRACTITIONER

## 2018-08-03 PROCEDURE — 1123F ACP DISCUSS/DSCN MKR DOCD: CPT | Performed by: NURSE PRACTITIONER

## 2018-08-03 PROCEDURE — 1036F TOBACCO NON-USER: CPT | Performed by: NURSE PRACTITIONER

## 2018-08-03 PROCEDURE — 99214 OFFICE O/P EST MOD 30 MIN: CPT | Performed by: NURSE PRACTITIONER

## 2018-08-03 PROCEDURE — G8420 CALC BMI NORM PARAMETERS: HCPCS | Performed by: NURSE PRACTITIONER

## 2018-08-03 PROCEDURE — G8427 DOCREV CUR MEDS BY ELIG CLIN: HCPCS | Performed by: NURSE PRACTITIONER

## 2018-08-03 RX ORDER — ATORVASTATIN CALCIUM 40 MG/1
40 TABLET, FILM COATED ORAL NIGHTLY
Qty: 90 TABLET | Refills: 1 | Status: SHIPPED | OUTPATIENT
Start: 2018-08-03 | End: 2019-04-23 | Stop reason: SDUPTHER

## 2018-08-03 RX ORDER — CLOPIDOGREL BISULFATE 75 MG/1
75 TABLET ORAL DAILY
Qty: 90 TABLET | Refills: 1 | Status: SHIPPED | OUTPATIENT
Start: 2018-08-03 | End: 2019-04-23 | Stop reason: SDUPTHER

## 2018-08-03 RX ORDER — MAGNESIUM OXIDE 400 MG/1
400 TABLET ORAL DAILY
Qty: 90 TABLET | Refills: 1 | Status: SHIPPED | OUTPATIENT
Start: 2018-08-03 | End: 2019-04-23 | Stop reason: SDUPTHER

## 2018-08-03 RX ORDER — LISINOPRIL 10 MG/1
10 TABLET ORAL DAILY
Qty: 90 TABLET | Refills: 1 | Status: SHIPPED | OUTPATIENT
Start: 2018-08-03 | End: 2018-09-07 | Stop reason: SDUPTHER

## 2018-08-03 RX ORDER — METOPROLOL SUCCINATE 50 MG/1
50 TABLET, EXTENDED RELEASE ORAL DAILY
Qty: 90 TABLET | Refills: 1 | Status: SHIPPED | OUTPATIENT
Start: 2018-08-03 | End: 2019-04-23 | Stop reason: SINTOL

## 2018-08-03 RX ORDER — FUROSEMIDE 20 MG/1
20 TABLET ORAL SEE ADMIN INSTRUCTIONS
Qty: 60 TABLET | Refills: 5
Start: 2018-08-03 | End: 2019-04-23 | Stop reason: SDUPTHER

## 2018-08-03 NOTE — LETTER
[address]  [id number] [facility]  Phone: [phone]  Fax: [phone] [facility]        August 6, 2018     Samir Mcclellan MD  [facility]    Patient: Susana Dumont  MR Number: [id number]  YOB: 1927  Date of Visit: 8/3/2018    Dear Dr. Samir Mclcellan:    Thank you for the request for consultation for Daria Diaz to me for the evaluation. Below are the relevant portions of my assessment and plan of care. [address]   Cardiac Follow-up    Primary Care Doctor:  Samir Mcclellan MD    No chief complaint on file. History of Present Illness:   I had the pleasure of seeing Susana Dumont in follow up for cardiomyopathy. Echo at [facility] 1/2018 showed EF of 35-40%, mild pulmonary HTN  Started toprol 25mg daily and lisinopril 4/2018 for cardiomyopathy  Recent stress test was abnormal and then underwent cardiac cath and PCI of distal LAD for abnormal FFR with Xience Alpine drug-eluting stent. He also had high-grade disease of ostial Ramus, ostial Diagonal 1, Diagonal 2 and Diagonal 3 branches. Since last visit, he was seen by EP and then also Sleep. He had sleep study last night. He reports his edema is improved. He has good days and bad days with breathing. Continues with dyspnea with minimal exertion, only able to walk about 15-20 steps and then has dyspnea. No wheezing or cough. No lightheadedness or dizziness. Not falling. Drinks coffee, water and milk. Not checking home weights. Activity is also limited by his joint pains. Appetite is good. He is only taking his lasix about three times a week as he doesn't like to take it with going out to the multiple appointments. Here with his son today. Jaylon Kobe Lynn describes symptoms including dyspnea, fatigue but denies palpitations, orthopnea, early saiety, edema, syncope.      NYHA:   III Allergies:  Ciprofloxacin     Review of Systems:   · Constitutional: there has been no unanticipated weight loss. · Eyes: No vision changes  · ENT: No Headaches, no nasal congestion. No mouth sores or sore throat. · Cardiovascular: Reviewed in HPI  · Respiratory: No cough or wheezing, no sputum production. · Gastrointestinal: No abdominal pain, + occasional constipation or diarrhea  · Genitourinary: No dysuria, trouble voiding, or hematuria. · Musculoskeletal:  No weakness or joint complaints. · Integumentary: No rash or pruritis. · Neurological: No numbness or tingling. No weakness. No tremor. · Psychiatric: No anxiety, no depression. · Endocrine:  No excessive thirst or urination. · Hematologic/Lymphatic: No abnormal bruising or bleeding, blood clots or swollen lymph nodes. Physical Examination:    Vitals:    08/03/18 1034 08/03/18 1040   BP: (!) 150/80 (!) 144/74   Pulse: 50    SpO2: 94%    Weight: 178 lb (80.7 kg)    Height: 6' 1\" (1.854 m)         Constitutional and General Appearance: no apparent distress  HEENT: non-icteric sclera, oropharynx without exudate, oral mucosa moist  Neck: JVP less than 8 cm H20  Respiratory:  · No use of accessory muscles  · Clear breath sounds throughout, no wheezing, no crackles, no rhonchi  Cardiovascular:  · The apical impulses not displaced  · no murmur/rub/gallop  · Regular rate and rhythm, S1,S2 normal  · Radial pulses 2+ and equal bilaterally  · No edema  · Pedal Pulses: 2+ and equal   Abdomen:  · No masses or tenderness  · Liver: No Abnormalities Noted  Musculoskeletal/Skin:  · Walks with a cane.    · There is no clubbing, cyanosis of the extremities  · Skin is warm and dry  · Moves all extremities well  Neurological/Psychiatric:  · Alert and oriented in all spheres  · No abnormalities of mood, affect, memory, mentation, or behavior are noted    Lab Data:  CBC:   Lab Results   Component Value Date    WBC 6.0 06/08/2018    WBC 6.8 11/28/2016

## 2018-08-03 NOTE — PROGRESS NOTES
[address]   Cardiac Follow-up    Primary Care Doctor:  Jimena Urbina MD    Chief Complaint   Patient presents with    1 Month Follow-Up    Coronary Artery Disease    Cardiomyopathy    Hypertension    Congestive Heart Failure    Shortness of Breath     with activity mostly. History of Present Illness:   I had the pleasure of seeing Curly Search in follow up for cardiomyopathy. Echo at [facility] 1/2018 showed EF of 35-40%, mild pulmonary HTN  Started toprol 25mg daily and lisinopril 4/2018 for cardiomyopathy  Recent stress test was abnormal and then underwent cardiac cath and PCI of distal LAD for abnormal FFR with Xience Alpine drug-eluting stent. He also had high-grade disease of ostial Ramus, ostial Diagonal 1, Diagonal 2 and Diagonal 3 branches. Since last visit, he was seen by EP and then also Sleep. He had sleep study last night. He reports his edema is improved. He has good days and bad days with breathing. Continues with dyspnea with minimal exertion, only able to walk about 15-20 steps and then has dyspnea. No wheezing or cough. No lightheadedness or dizziness. Not falling. Drinks coffee, water and milk. Not checking home weights. Activity is also limited by his joint pains. Appetite is good. He is only taking his lasix about three times a week as he doesn't like to take it with going out to the multiple appointments. Here with his son today. Jovita Lynn describes symptoms including dyspnea, fatigue but denies palpitations, orthopnea, early saiety, edema, syncope. NYHA:   III  ACC/ AHA Stage:    C    Past Medical History:   has a past medical history of Acid reflux; Arthritis; Hypertension; Neuropathy ([address] and Prostate cancer ([address] Surgical History:   has a past surgical history that includes hernia repair (2010); Total knee arthroplasty (Left, 3/21/16); joint replacement; knee surgery; and Total knee arthroplasty (Right, 11/25/2016).    Social diarrhea  · Genitourinary: No dysuria, trouble voiding, or hematuria. · Musculoskeletal:  No weakness or joint complaints. · Integumentary: No rash or pruritis. · Neurological: No numbness or tingling. No weakness. No tremor. · Psychiatric: No anxiety, no depression. · Endocrine:  No excessive thirst or urination. · Hematologic/Lymphatic: No abnormal bruising or bleeding, blood clots or swollen lymph nodes. Physical Examination:    Vitals:    08/03/18 1034 08/03/18 1040   BP: (!) 150/80 (!) 144/74   Pulse: 50    SpO2: 94%    Weight: 178 lb (80.7 kg)    Height: 6' 1\" (1.854 m)         Constitutional and General Appearance: no apparent distress  HEENT: non-icteric sclera, oropharynx without exudate, oral mucosa moist  Neck: JVP less than 8 cm H20  Respiratory:  · No use of accessory muscles  · Clear breath sounds throughout, no wheezing, no crackles, no rhonchi  Cardiovascular:  · The apical impulses not displaced  · no murmur/rub/gallop  · Regular rate and rhythm, S1,S2 normal  · Radial pulses 2+ and equal bilaterally  · No edema  · Pedal Pulses: 2+ and equal   Abdomen:  · No masses or tenderness  · Liver: No Abnormalities Noted  Musculoskeletal/Skin:  · Walks with a cane.    · There is no clubbing, cyanosis of the extremities  · Skin is warm and dry  · Moves all extremities well  Neurological/Psychiatric:  · Alert and oriented in all spheres  · No abnormalities of mood, affect, memory, mentation, or behavior are noted    Lab Data:  CBC:   Lab Results   Component Value Date    WBC 6.0 06/08/2018    WBC 6.8 11/28/2016    WBC 7.1 11/27/2016    RBC 5.30 06/08/2018    RBC 3.75 11/28/2016    RBC 3.77 11/27/2016    HGB 16.5 06/08/2018    HGB 11.4 11/28/2016    HGB 11.3 11/27/2016    HCT 48.2 06/08/2018    HCT 33.8 11/28/2016    HCT 34.4 11/27/2016    MCV 91.0 06/08/2018    MCV 90.0 11/28/2016    MCV 91.3 11/27/2016    RDW 15.7 06/08/2018    RDW 13.6 11/28/2016    RDW 14.2 11/27/2016     06/08/2018     partial collapse of the left lower lobe.   There is a trace left pleural effusion and mild left basilar airspace disease. While this is likely related to mass effect from the hiatal hernia, mild pneumonia is not excluded.  Clinical correlation recommended.     Stress test 5/7/18  Abnormal high risk myocardial perfusion study. [place] is a small area of ischemia within the posterior-lateral segment. [place] is an additional area of possible ischemia within the basal anterior  wall.  The estimated left ventricular function is 27%.  The left ventricular size is moderately dilated.     Cardiac Cath 6/8/18  1.  Successful PCI of distal LAD with Xience Alpine drug-eluting stent. 2.  High-grade disease of ostial Ramus, ostial Diagonal 1, Diagonal 2 and  Diagonal 3 branches. 3.  Normal left ventricular filling pressure. 4.  Mildly elevated right-sided filling pressures. 5.  PA, PCWP, and PVR suggest that the pulmonary hypertension is due to  pulmonary disease. 6.  Mildly reduced cardiac index. 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 Ischemic cardiomyopathy    2. Coronary artery disease involving native coronary artery of native heart without angina pectoris    3. Chronic combined systolic and diastolic congestive heart failure ([address]    4. COPD, severe ([address]    5. Essential hypertension          Plan:   1. Check BMP- standing order - he plans to have completed at [facility]  2. Continue lasix 1 tab three times a week as your are already taking  3. After labs checked, will consider increasing lisinopril to 15mg daily  4. Monitor for lightheadedness/dizziness  5. Recommend checking daily weights, if you are not able to check daily weight then check at least a weekly weight.   If you gain 3lbs in a day or

## 2018-08-03 NOTE — PATIENT INSTRUCTIONS
1. Check BMP- standing order   2. Continue lasix 1 tab three times a week as your are already taking  3. After labs checked, will consider increasing lisinopril to 15mg daily  4. Monitor for lightheadedness/dizziness  5. Recommend checking daily weights, if you are not able to check daily weight then check at least a weekly weight. If you gain 3lbs in a day or 5lbs in a week- you should be taking your lasix (water pill that day).   Follow up 4-6 weeks

## 2018-08-06 NOTE — COMMUNICATION BODY
[address]   Cardiac Follow-up    Primary Care Doctor:  Kaylie Adame MD    No chief complaint on file. History of Present Illness:   I had the pleasure of seeing Shannon Rasheed in follow up for cardiomyopathy. Echo at [facility] 1/2018 showed EF of 35-40%, mild pulmonary HTN  Started toprol 25mg daily and lisinopril 4/2018 for cardiomyopathy  Recent stress test was abnormal and then underwent cardiac cath and PCI of distal LAD for abnormal FFR with Xience Alpine drug-eluting stent. He also had high-grade disease of ostial Ramus, ostial Diagonal 1, Diagonal 2 and Diagonal 3 branches. Since last visit, he was seen by EP and then also Sleep. He had sleep study last night. He reports his edema is improved. He has good days and bad days with breathing. Continues with dyspnea with minimal exertion, only able to walk about 15-20 steps and then has dyspnea. No wheezing or cough. No lightheadedness or dizziness. Not falling. Drinks coffee, water and milk. Not checking home weights. Activity is also limited by his joint pains. Appetite is good. He is only taking his lasix about three times a week as he doesn't like to take it with going out to the multiple appointments. Here with his son today. Jessie Lynn describes symptoms including dyspnea, fatigue but denies palpitations, orthopnea, early saiety, edema, syncope. NYHA:   III  ACC/ AHA Stage:    C    Past Medical History:   has a past medical history of Acid reflux; Arthritis; Hypertension; Neuropathy ([address] and Prostate cancer ([address] Surgical History:   has a past surgical history that includes hernia repair (2010); Total knee arthroplasty (Left, 3/21/16); joint replacement; knee surgery; and Total knee arthroplasty (Right, 11/25/2016). Social History:   reports that he quit smoking about 68 years ago. His smoking use included Pipe. He quit after 5.00 years of use.  He has never used smokeless tobacco. He reports that he 11/28/2016    K 3.9 06/10/2018    K 4.5 06/08/2018    K 4.5 11/28/2016    CL 99 06/10/2018    CL 95 06/08/2018     11/28/2016    CO2 29 06/10/2018    CO2 27 06/08/2018    CO2 28 11/28/2016    BUN 18 06/10/2018    BUN 25 06/08/2018    BUN 32 11/28/2016    CREATININE 0.9 06/10/2018    CREATININE 1.2 06/08/2018    CREATININE 1.5 11/28/2016     BNP: No results found for: PROBNP  Lipids:   Lab Results   Component Value Date    CHOL 176 06/08/2018        Lab Results   Component Value Date    TRIG 66 06/08/2018        Lab Results   Component Value Date     (H) 06/08/2018        Lab Results   Component Value Date    LDLCALC 61 06/08/2018        Lab Results   Component Value Date    LABVLDL 13 06/08/2018      No results found for: CHOLHDLRATIO    EF: No results found for: LVEF, LVEFMODE    Recent Testing:  Echo 6/9/18  The left ventricular systolic function is moderately reduced with an ejection fraction of 30 -35 %. There is hypokinesis of the inferolateral, anterior and inferior walls. There is akinesis of the basal anterior and mid inferoseptum walls. Mild concentric left ventricular hypertrophy. Left ventricular diastolic filling pressure is elevated. Mild mitral and pulmonic regurgitation. Moderate bi-atrial enlargement. The right ventricle is mildly enlarged. Right ventricular systolic function is mildly reduced . Moderate tricuspid regurgitation. Systolic pulmonic artery pressure (SPAP) is estimated at 53 mmHg consistent with mild pulmonary hypertension (Right atrial pressure of 3mmHg). The pulmonic valve is not well visualized.     CT Chest 5/31/18  There is a large hiatal hernia containing the entire stomach as well as a portion transverse colon and pancreas.  This causes mass effect on the left lower lung with partial collapse of the left lower lobe.   There is a trace left pleural effusion and mild left basilar airspace disease. While this is likely related to mass effect from the hiatal hernia, mild pneumonia is not excluded.  Clinical correlation recommended.     Stress test 5/7/18  Abnormal high risk myocardial perfusion study. Krystal [place] is a small area of ischemia within the posterior-lateral segment. K[place] is an additional area of possible ischemia within the basal anterior  wall.  The estimated left ventricular function is 27%.  The left ventricular size is moderately dilated.     Cardiac Cath 6/8/18  1.  Successful PCI of distal LAD with Xience Alpine drug-eluting stent. 2.  High-grade disease of ostial Ramus, ostial Diagonal 1, Diagonal 2 and  Diagonal 3 branches. 3.  Normal left ventricular filling pressure. 4.  Mildly elevated right-sided filling pressures. 5.  PA, PCWP, and PVR suggest that the pulmonary hypertension is due to  pulmonary disease. 6.  Mildly reduced cardiac index. 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 Ischemic cardiomyopathy    2. Coronary artery disease involving native coronary artery of native heart without angina pectoris    3. Chronic combined systolic and diastolic congestive heart failure ([address]    4. COPD, severe ([address]    5. Essential hypertension          Plan:   1. Check BMP- standing order - he plans to have completed at [address]  2. Continue lasix 1 tab three times a week as your are already taking  3. After labs checked, will consider increasing lisinopril to 15mg daily  4. Monitor for lightheadedness/dizziness  5. Recommend checking daily weights, if you are not able to check daily weight then check at least a weekly weight. If you gain 3lbs in a day or 5lbs in a week- you should be taking your lasix (water pill that day). Follow up 4-6 weeks    QUALITY MEASURES  1. Tobacco Cessation Counseling: NA  2.  Retake of BP if >140/90:

## 2018-08-07 LAB
BUN BLDV-MCNC: 23 MG/DL
CALCIUM SERPL-MCNC: 8.9 MG/DL
CHLORIDE BLD-SCNC: 102 MMOL/L
CO2: 31 MMOL/L
CREAT SERPL-MCNC: 1.2 MG/DL
GFR CALCULATED: 57
GLUCOSE BLD-MCNC: 134 MG/DL
POTASSIUM SERPL-SCNC: 4.4 MMOL/L
SODIUM BLD-SCNC: 139 MMOL/L

## 2018-09-07 ENCOUNTER — OFFICE VISIT (OUTPATIENT)
Dept: CARDIOLOGY CLINIC | Age: 83
End: 2018-09-07

## 2018-09-07 VITALS
HEART RATE: 64 BPM | DIASTOLIC BLOOD PRESSURE: 82 MMHG | SYSTOLIC BLOOD PRESSURE: 132 MMHG | WEIGHT: 182 LBS | HEIGHT: 73 IN | BODY MASS INDEX: 24.12 KG/M2 | OXYGEN SATURATION: 94 %

## 2018-09-07 DIAGNOSIS — I25.5 ISCHEMIC CARDIOMYOPATHY: Primary | ICD-10-CM

## 2018-09-07 DIAGNOSIS — I50.42 CHRONIC COMBINED SYSTOLIC AND DIASTOLIC CONGESTIVE HEART FAILURE (HCC): ICD-10-CM

## 2018-09-07 DIAGNOSIS — I25.10 CORONARY ARTERY DISEASE INVOLVING NATIVE CORONARY ARTERY OF NATIVE HEART WITHOUT ANGINA PECTORIS: ICD-10-CM

## 2018-09-07 DIAGNOSIS — I10 ESSENTIAL HYPERTENSION: ICD-10-CM

## 2018-09-07 PROCEDURE — 99213 OFFICE O/P EST LOW 20 MIN: CPT | Performed by: NURSE PRACTITIONER

## 2018-09-07 PROCEDURE — 1036F TOBACCO NON-USER: CPT | Performed by: NURSE PRACTITIONER

## 2018-09-07 PROCEDURE — G8598 ASA/ANTIPLAT THER USED: HCPCS | Performed by: NURSE PRACTITIONER

## 2018-09-07 PROCEDURE — 1101F PT FALLS ASSESS-DOCD LE1/YR: CPT | Performed by: NURSE PRACTITIONER

## 2018-09-07 PROCEDURE — 4040F PNEUMOC VAC/ADMIN/RCVD: CPT | Performed by: NURSE PRACTITIONER

## 2018-09-07 PROCEDURE — G8420 CALC BMI NORM PARAMETERS: HCPCS | Performed by: NURSE PRACTITIONER

## 2018-09-07 PROCEDURE — 1123F ACP DISCUSS/DSCN MKR DOCD: CPT | Performed by: NURSE PRACTITIONER

## 2018-09-07 PROCEDURE — G8427 DOCREV CUR MEDS BY ELIG CLIN: HCPCS | Performed by: NURSE PRACTITIONER

## 2018-09-07 RX ORDER — LISINOPRIL 10 MG/1
15 TABLET ORAL DAILY
Qty: 90 TABLET | Refills: 1 | Status: SHIPPED | OUTPATIENT
Start: 2018-09-07 | End: 2019-04-23 | Stop reason: SDUPTHER

## 2018-09-07 NOTE — PATIENT INSTRUCTIONS
Plan:   1. ontinue lasix 1 tab two to three times a week as your are already taking  2. Increase lisinopril 15mg once a day; check labs in about 2-3 weeks after increasing this medications  3. Recommend checking daily weights, if you are not able to check daily weight then check at least a weekly weight. If you gain 3lbs in a day or 5lbs in a week- you should be taking your lasix (water pill that day).   Follow up 10 weeks

## 2018-09-07 NOTE — PROGRESS NOTES
partial collapse of the left lower lobe.   There is a trace left pleural effusion and mild left basilar airspace disease. While this is likely related to mass effect from the hiatal hernia, mild pneumonia is not excluded.  Clinical correlation recommended.     Stress test 5/7/18  Abnormal high risk myocardial perfusion study. Veronica [place] is a small area of ischemia within the posterior-lateral segment. Veronica [place] is an additional area of possible ischemia within the basal anterior  wall.  The estimated left ventricular function is 27%.  The left ventricular size is moderately dilated.     Cardiac Cath 6/8/18  1.  Successful PCI of distal LAD with Xience Alpine drug-eluting stent. 2.  High-grade disease of ostial Ramus, ostial Diagonal 1, Diagonal 2 and  Diagonal 3 branches. 3.  Normal left ventricular filling pressure. 4.  Mildly elevated right-sided filling pressures. 5.  PA, PCWP, and PVR suggest that the pulmonary hypertension is due to  pulmonary disease. 6.  Mildly reduced cardiac index. 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 Ischemic cardiomyopathy    2. Coronary artery disease involving native coronary artery of native heart without angina pectoris    3. Chronic combined systolic and diastolic congestive heart failure ([address]    4. Essential hypertension          Plan:   1. Continue lasix 1 tab two to three times a week as your are already taking  2. Increase lisinopril 15mg once a day for cardiomyopathy; check labs in about 2-3 weeks after increasing this medications  3. Recommend checking daily weights, if you are not able to check daily weight then check at least a weekly weight. If you gain 3lbs in a day or 5lbs in a week- you should be taking your lasix (water pill that day).   Follow

## 2018-09-13 NOTE — COMMUNICATION BODY
[address]   Cardiac Follow-up    Primary Care Doctor:  Catherine Lee MD    Chief Complaint   Patient presents with    1 Month Follow-Up     EF 30-35%    Congestive Heart Failure    Cardiomyopathy    Coronary Artery Disease    Hypertension    Discuss Labs     08/07/2018 bmp    Shortness of Breath     little better    Fatigue     little low on energy        History of Present Illness:   I had the pleasure of seeing Pallavi Loredo in follow up for cardiomyopathy. Echo at [facility] 1/2018 showed EF of 35-40%, mild pulmonary HTN. Started toprol 25mg daily and lisinopril 4/2018 for cardiomyopathy  Stress test was abnormal and then underwent cardiac cath and PCI of distal LAD for abnormal FFR with Xience Alpine drug-eluting stent. He also had high-grade disease of ostial Ramus, ostial Diagonal 1, Diagonal 2 and Diagonal 3 branches. Since last visit, his breathing is a little improved. Appetite is improved. Continues with shortness of breath with activity but feels like he is able to be more active. He continues to exercise. Walking on the treadmill. He does have shortness of breath with walking long distances and has to rest. Takes a few breaths to recover. Continues with low energy and fatigue, marginally improved. No edema in the legs. Taking his lasix 2-3 times a week. Pallavi Loredo describes symptoms including dyspnea, fatigue but denies chest pain, palpitations, orthopnea, exertional chest pressure/discomfort, early saiety, syncope. NYHA:   III  ACC/ AHA Stage:    C    Past Medical History:   has a past medical history of Acid reflux; Arthritis; Hypertension; Neuropathy; and Prostate cancer ([facility] Surgical History:   has a past surgical history that includes hernia repair (2010); Total knee arthroplasty (Left, 3/21/16); joint replacement; knee surgery; and Total knee arthroplasty (Right, 11/25/2016).    Social History:   reports that he quit smoking about 68 years

## 2018-10-12 ENCOUNTER — TELEPHONE (OUTPATIENT)
Dept: PULMONOLOGY | Age: 83
End: 2018-10-12

## 2018-10-16 LAB
BUN BLDV-MCNC: 27 MG/DL
CALCIUM SERPL-MCNC: 9 MG/DL
CHLORIDE BLD-SCNC: 105 MMOL/L
CO2: 32 MMOL/L
CREAT SERPL-MCNC: 1.3 MG/DL
GFR CALCULATED: 52
GLUCOSE BLD-MCNC: 88 MG/DL
POTASSIUM SERPL-SCNC: 4.3 MMOL/L
SODIUM BLD-SCNC: 141 MMOL/L

## 2018-10-25 ENCOUNTER — TELEPHONE (OUTPATIENT)
Dept: CARDIOLOGY CLINIC | Age: 83
End: 2018-10-25

## 2018-10-25 NOTE — TELEPHONE ENCOUNTER
Basic Metabolic Panel   Order: [id number]   Status:  Final result   Visible to patient:  No (Not Released)   Notes recorded by PEYMAN Barnett CNP on 10/24/2018 at 8:23 PM EDT  Labs are stable since last medication adjustment. No changes. Keep follow up. Attempted to reach.

## 2018-11-02 ENCOUNTER — OFFICE VISIT (OUTPATIENT)
Dept: CARDIOLOGY CLINIC | Age: 83
End: 2018-11-02
Payer: MEDICARE

## 2018-11-02 VITALS
DIASTOLIC BLOOD PRESSURE: 76 MMHG | BODY MASS INDEX: 24.52 KG/M2 | HEART RATE: 62 BPM | SYSTOLIC BLOOD PRESSURE: 134 MMHG | HEIGHT: 73 IN | WEIGHT: 185 LBS | OXYGEN SATURATION: 94 %

## 2018-11-02 DIAGNOSIS — I25.10 CORONARY ARTERY DISEASE INVOLVING NATIVE CORONARY ARTERY OF NATIVE HEART WITHOUT ANGINA PECTORIS: ICD-10-CM

## 2018-11-02 DIAGNOSIS — I50.42 CHRONIC COMBINED SYSTOLIC AND DIASTOLIC CONGESTIVE HEART FAILURE (HCC): ICD-10-CM

## 2018-11-02 DIAGNOSIS — I10 ESSENTIAL HYPERTENSION: ICD-10-CM

## 2018-11-02 DIAGNOSIS — I25.5 ISCHEMIC CARDIOMYOPATHY: Primary | ICD-10-CM

## 2018-11-02 PROCEDURE — G8484 FLU IMMUNIZE NO ADMIN: HCPCS | Performed by: NURSE PRACTITIONER

## 2018-11-02 PROCEDURE — 1123F ACP DISCUSS/DSCN MKR DOCD: CPT | Performed by: NURSE PRACTITIONER

## 2018-11-02 PROCEDURE — 1101F PT FALLS ASSESS-DOCD LE1/YR: CPT | Performed by: NURSE PRACTITIONER

## 2018-11-02 PROCEDURE — 99214 OFFICE O/P EST MOD 30 MIN: CPT | Performed by: NURSE PRACTITIONER

## 2018-11-02 PROCEDURE — G8598 ASA/ANTIPLAT THER USED: HCPCS | Performed by: NURSE PRACTITIONER

## 2018-11-02 PROCEDURE — G8427 DOCREV CUR MEDS BY ELIG CLIN: HCPCS | Performed by: NURSE PRACTITIONER

## 2018-11-02 PROCEDURE — G8420 CALC BMI NORM PARAMETERS: HCPCS | Performed by: NURSE PRACTITIONER

## 2018-11-02 PROCEDURE — 4040F PNEUMOC VAC/ADMIN/RCVD: CPT | Performed by: NURSE PRACTITIONER

## 2018-11-02 PROCEDURE — 1036F TOBACCO NON-USER: CPT | Performed by: NURSE PRACTITIONER

## 2018-11-02 NOTE — PATIENT INSTRUCTIONS
1. Check Echo- call our office for the results after you obtain it at [facility]  2. No changes to medications  3. Follow up with your family doctor   Recheck BMP in about 1-2 months  4.  Follow up 3 months or sooner if needed

## 2018-11-02 NOTE — COMMUNICATION BODY
[address]   Cardiac Follow-up    Primary Care Doctor:  James Berger MD    Chief Complaint   Patient presents with    3 Month Follow-Up     10 wks, EF 30-35%    Congestive Heart Failure    Cardiomyopathy    Coronary Artery Disease    Hypertension    Medication Adjustment    Discuss Labs     bmp 10/16/2018    Shortness of Breath     same maybe a little better    Fatigue     tires very quickly        History of Present Illness:   I had the pleasure of seeing Sintia Grandchild in follow up for cardiomyopathy. Echo at [facility] 1/2018 showed EF of 35-40%, mild pulmonary HTN. Started toprol 25mg daily and lisinopril 4/2018 for cardiomyopathy  Stress test was abnormal and then underwent cardiac cath and PCI of distal LAD for abnormal FFR with Xience Alpine drug-eluting stent. He also had high-grade disease of ostial Ramus, ostial Diagonal 1, Diagonal 2 and Diagonal 3 branches. Since last visit, he feels like his breathing is a little improved, trying to stay active. Able to walk about 300yards. No dizziness. No falling but he does have some muscle fatigue when standing for long periods of time, he is concerned about falling. Walking every day. Currently on a CPAP machine, and getting use to it. He has no edema. He is taking his lasix 1-2 times a week. Last visit, lisinopril was increased to 15mg daily for cardiomyopathy and last renal panel is stable. Sintia Grandchild describes symptoms including dyspnea, fatigue but denies chest pain, palpitations, orthopnea, edema, syncope. NYHA:   III  ACC/ AHA Stage:    C    Past Medical History:   has a past medical history of Acid reflux; Arthritis; Hypertension; Neuropathy; and Prostate cancer ([facility] [address] Surgical History:   has a past surgical history that includes hernia repair (2010); Total knee arthroplasty (Left, 3/21/16); joint replacement; knee surgery; and Total knee arthroplasty (Right, 11/25/2016).    Social History:   reports that he quit smoking about 68 years ago. His smoking use included Pipe. He quit after 5.00 years of use. He has never used smokeless tobacco. He reports that he drinks alcohol. He reports that he does not use drugs. Family History:   Family History   Problem Relation Age of Onset    Other Brother         aorta replacement - heart failure    Pacemaker Brother     Heart Disease Brother        Home Medications:  Prior to Admission medications    Medication Sig Start Date End Date Taking? Authorizing Provider   lisinopril (PRINIVIL;ZESTRIL) 10 MG tablet Take 1.5 tablets by mouth daily 9/7/18  Yes PEYMAN Jones CNP   furosemide (LASIX) 20 MG tablet Take 1 tablet by mouth See Admin Instructions 1 tab three times a week 8/3/18  Yes PEYMAN Jones CNP   atorvastatin (LIPITOR) 40 MG tablet Take 1 tablet by mouth nightly 8/3/18  Yes PEYMAN Jones CNP   clopidogrel (PLAVIX) 75 MG tablet Take 1 tablet by mouth daily 8/3/18  Yes PEYMAN Jones CNP   metoprolol succinate (TOPROL XL) 50 MG extended release tablet Take 1 tablet by mouth daily 8/3/18  Yes PEYMAN Jones CNP   magnesium oxide (MAG-OX) 400 MG tablet Take 1 tablet by mouth daily 8/3/18  Yes PEYMAN Jones CNP   nitroGLYCERIN (NITROSTAT) 0.4 MG SL tablet Place 1 tablet under the tongue every 5 minutes as needed for Chest pain 6/10/18  Yes Jesse Gray MD   aspirin 81 MG chewable tablet Take 1 tablet by mouth daily 6/10/18  Yes PEYMAN Rodriguez CNP   esomeprazole Magnesium (NEXIUM) 20 MG PACK Take 20 mg by mouth daily Pt takes every other day   Yes Historical Provider, MD        Allergies:  Ciprofloxacin     Review of Systems:   · Constitutional: there has been no unanticipated weight loss. · Eyes: No vision changes  · ENT: No Headaches, no nasal congestion. No mouth sores or sore throat.   · Cardiovascular: Reviewed in HPI  · Respiratory: No cough or wheezing, no sputum

## 2018-11-02 NOTE — PROGRESS NOTES
11/28/2016    RDW 14.2 11/27/2016     06/08/2018     11/28/2016     11/27/2016     Iron: No results found for: IRON, TIBC, FERRITIN  BMP:   Lab Results   Component Value Date     10/16/2018     08/07/2018     06/10/2018    K 4.3 10/16/2018    K 4.4 08/07/2018    K 3.9 06/10/2018     10/16/2018     08/07/2018    CL 99 06/10/2018    CO2 32 10/16/2018    CO2 31 08/07/2018    CO2 29 06/10/2018    BUN 27 10/16/2018    BUN 23 08/07/2018    BUN 18 06/10/2018    CREATININE 1.3 10/16/2018    CREATININE 1.2 08/07/2018    CREATININE 0.9 06/10/2018     BNP: No results found for: PROBNP  Lipids:   Lab Results   Component Value Date    CHOL 176 06/08/2018        Lab Results   Component Value Date    TRIG 66 06/08/2018        Lab Results   Component Value Date     (H) 06/08/2018        Lab Results   Component Value Date    LDLCALC 61 06/08/2018        Lab Results   Component Value Date    LABVLDL 13 06/08/2018      No results found for: CHOLHDLRATIO    EF: No results found for: LVEF, LVEFMODE    Recent Testing:  Echo 6/9/18  The left ventricular systolic function is moderately reduced with an ejection fraction of 30 -35 %. There is hypokinesis of the inferolateral, anterior and inferior walls. There is akinesis of the basal anterior and mid inferoseptum walls. Mild concentric left ventricular hypertrophy. Left ventricular diastolic filling pressure is elevated. Mild mitral and pulmonic regurgitation. Moderate bi-atrial enlargement. The right ventricle is mildly enlarged. Right ventricular systolic function is mildly reduced . Moderate tricuspid regurgitation. Systolic pulmonic artery pressure (SPAP) is estimated at 53 mmHg consistent with mild pulmonary hypertension (Right atrial pressure of 3mmHg). The pulmonic valve is not well visualized.     CT Chest 5/31/18  There is a large hiatal hernia containing the entire stomach as well as a portion transverse colon and doctor   Recheck BMP in about 1-2 months  4. Follow up 3 months or sooner if needed    QUALITY MEASURES  1. Tobacco Cessation Counseling: NA  2. Retake of BP if >140/90:   NA  3. Documentation to PCP/referring for new patient:  Sent to PCP at close of office visit  4. CAD patient on anti-platelet: Yes  5. CAD patient on STATIN therapy:  Yes  6. Patient with CHF and aFib on anticoagulation:  NA     I appreciate the opportunity for caring for this patient.      Yolande Tamez CNP, 11/2/2018, 11:03 AM

## 2019-01-31 ENCOUNTER — TELEPHONE (OUTPATIENT)
Dept: CARDIOLOGY | Age: 84
End: 2019-01-31

## 2019-04-19 ENCOUNTER — TELEPHONE (OUTPATIENT)
Dept: CARDIOLOGY CLINIC | Age: 84
End: 2019-04-19

## 2019-04-19 NOTE — TELEPHONE ENCOUNTER
Patient called stating that he received a letter from [facility] [employer] stating that his medications cannot be sent until our office authorizes the medication.  Patient states he thinks it was for 3 of his medications but could only remember lisinopril

## 2019-04-19 NOTE — TELEPHONE ENCOUNTER
Left message for pt to call back to get more info. But it looks like patient saw [facility] in nov. And was supposed to f/u in 3 mo. And has not and does not have an appt coming up.

## 2019-04-23 RX ORDER — ATORVASTATIN CALCIUM 40 MG/1
40 TABLET, FILM COATED ORAL NIGHTLY
Qty: 90 TABLET | Refills: 0 | Status: SHIPPED | OUTPATIENT
Start: 2019-04-23

## 2019-04-23 RX ORDER — MAGNESIUM OXIDE 400 MG/1
400 TABLET ORAL DAILY
Qty: 90 TABLET | Refills: 1 | Status: SHIPPED | OUTPATIENT
Start: 2019-04-23

## 2019-04-23 RX ORDER — LISINOPRIL 10 MG/1
15 TABLET ORAL DAILY
Qty: 90 TABLET | Refills: 0 | Status: SHIPPED | OUTPATIENT
Start: 2019-04-23 | End: 2019-06-18 | Stop reason: SDUPTHER

## 2019-04-23 RX ORDER — CLOPIDOGREL BISULFATE 75 MG/1
75 TABLET ORAL DAILY
Qty: 90 TABLET | Refills: 0 | Status: SHIPPED | OUTPATIENT
Start: 2019-04-23

## 2019-04-23 RX ORDER — FUROSEMIDE 20 MG/1
20 TABLET ORAL SEE ADMIN INSTRUCTIONS
Qty: 30 TABLET | Refills: 0 | Status: SHIPPED | OUTPATIENT
Start: 2019-04-23

## 2019-04-23 NOTE — TELEPHONE ENCOUNTER
Spoke to patient, he made an appt @ [facility] w/ [facility] for the end of May. He states he will get his labs done this week. He has 1 week left of his medications.

## 2019-04-25 LAB
BUN BLDV-MCNC: 30 MG/DL
CALCIUM SERPL-MCNC: 9.6 MG/DL
CHLORIDE BLD-SCNC: 103 MMOL/L
CO2: 30 MMOL/L
CREAT SERPL-MCNC: 1.2 MG/DL
GFR CALCULATED: 57
GLUCOSE BLD-MCNC: 130 MG/DL
POTASSIUM SERPL-SCNC: 4.5 MMOL/L
SODIUM BLD-SCNC: 139 MMOL/L

## 2019-05-01 ENCOUNTER — TELEPHONE (OUTPATIENT)
Dept: CARDIOLOGY CLINIC | Age: 84
End: 2019-05-01

## 2019-05-01 NOTE — TELEPHONE ENCOUNTER
----- Message from PEYMAN Carson CNP sent at 4/30/2019  5:01 PM EDT -----  Labs are stable. No changes to meds. Keep follow up.

## 2019-06-14 ENCOUNTER — OFFICE VISIT (OUTPATIENT)
Dept: CARDIOLOGY CLINIC | Age: 84
End: 2019-06-14
Payer: MEDICARE

## 2019-06-14 VITALS
SYSTOLIC BLOOD PRESSURE: 114 MMHG | HEIGHT: 73 IN | WEIGHT: 187 LBS | HEART RATE: 50 BPM | BODY MASS INDEX: 24.78 KG/M2 | DIASTOLIC BLOOD PRESSURE: 64 MMHG | OXYGEN SATURATION: 94 %

## 2019-06-14 DIAGNOSIS — I10 ESSENTIAL HYPERTENSION: ICD-10-CM

## 2019-06-14 DIAGNOSIS — I25.5 ISCHEMIC CARDIOMYOPATHY: Primary | ICD-10-CM

## 2019-06-14 DIAGNOSIS — I50.42 CHRONIC COMBINED SYSTOLIC AND DIASTOLIC CONGESTIVE HEART FAILURE (HCC): ICD-10-CM

## 2019-06-14 DIAGNOSIS — I25.10 CORONARY ARTERY DISEASE INVOLVING NATIVE CORONARY ARTERY OF NATIVE HEART WITHOUT ANGINA PECTORIS: ICD-10-CM

## 2019-06-14 PROCEDURE — 1123F ACP DISCUSS/DSCN MKR DOCD: CPT | Performed by: NURSE PRACTITIONER

## 2019-06-14 PROCEDURE — G8420 CALC BMI NORM PARAMETERS: HCPCS | Performed by: NURSE PRACTITIONER

## 2019-06-14 PROCEDURE — 4040F PNEUMOC VAC/ADMIN/RCVD: CPT | Performed by: NURSE PRACTITIONER

## 2019-06-14 PROCEDURE — 99213 OFFICE O/P EST LOW 20 MIN: CPT | Performed by: NURSE PRACTITIONER

## 2019-06-14 PROCEDURE — 1036F TOBACCO NON-USER: CPT | Performed by: NURSE PRACTITIONER

## 2019-06-14 PROCEDURE — G8598 ASA/ANTIPLAT THER USED: HCPCS | Performed by: NURSE PRACTITIONER

## 2019-06-14 PROCEDURE — G8427 DOCREV CUR MEDS BY ELIG CLIN: HCPCS | Performed by: NURSE PRACTITIONER

## 2019-06-14 NOTE — PROGRESS NOTES
[facility], [place]   Cardiac Follow-up    Primary Care Doctor:  Thomas Cuevas MD    Chief Complaint   Patient presents with    6 Month Follow-Up    Congestive Heart Failure     EF 30-35%    Cardiomyopathy    Coronary Artery Disease    Hypertension    Discuss Labs     bmp 04/25/2019    Shortness of Breath     same    Fatigue        History of Present Illness:   I had the pleasure of seeing Mary George in follow up for cardiomyopathy. Echo at [facility] 1/2018 showed EF of 35-40%, mild pulmonary HTN. Started toprol 25mg daily and lisinopril 4/2018 for cardiomyopathy  Stress test was abnormal and then underwent cardiac cath and PCI of distal LAD for abnormal FFR with Xience Alpine drug-eluting stent. He also had high-grade disease of ostial Ramus, ostial Diagonal 1, Diagonal 2 and Diagonal 3 branches. Since last visit, he had echo showing improved LVEF 35-40%. Home weight is staying about 180lbs. He is doing well. Not walking as much as before as he is a little unsteady on his feet. No dizziness. No falling. Continues on lisinopril 15mg daily. Labs from April were stable. Issues with vision. No edema. Not coughing. Trying to drink more water. Taking lasix 2 tabs about 1-2 times a week. Mary George describes symptoms including fatigue but denies chest pain, palpitations, orthopnea, syncope. NYHA:   II-III  ACC/ AHA Stage:    C    Past Medical History:   has a past medical history of Acid reflux, Arthritis, CHF (congestive heart failure) ([address] Hypertension, Neuropathy, and Prostate cancer ([facility] [address] Surgical History:   has a past surgical history that includes hernia repair (2010); Total knee arthroplasty (Left, 3/21/16); joint replacement; knee surgery; and Total knee arthroplasty (Right, 11/25/2016). Social History:   reports that he quit smoking about 69 years ago. His smoking use included pipe. He quit after 5.00 years of use.  He has never used smokeless tobacco. He reports that he drinks alcohol. He reports that he does not use drugs. Family History:   Family History   Problem Relation Age of Onset    Other Brother         aorta replacement - heart failure    Pacemaker Brother     Heart Disease Brother        Home Medications:  Prior to Admission medications    Medication Sig Start Date End Date Taking? Authorizing Provider   atorvastatin (LIPITOR) 40 MG tablet Take 1 tablet by mouth nightly 4/23/19   PEYMAN Carson CNP   clopidogrel (PLAVIX) 75 MG tablet Take 1 tablet by mouth daily 4/23/19   PEYMAN Carson CNP   furosemide (LASIX) 20 MG tablet Take 1 tablet by mouth See Admin Instructions 1 tab three times a week 4/23/19   PEYMAN Carson CNP   lisinopril (PRINIVIL;ZESTRIL) 10 MG tablet Take 1.5 tablets by mouth daily 4/23/19   PEYMAN Carson CNP   magnesium oxide (MAG-OX) 400 MG tablet Take 1 tablet by mouth daily 4/23/19   PEYMAN Carson CNP   nitroGLYCERIN (NITROSTAT) 0.4 MG SL tablet Place 1 tablet under the tongue every 5 minutes as needed for Chest pain 6/10/18   Brenda Ramires MD   aspirin 81 MG chewable tablet Take 1 tablet by mouth daily 6/10/18   Ric Fabry, APRN - CNP   esomeprazole Magnesium (NEXIUM) 20 MG PACK Take 20 mg by mouth daily Pt takes every other day    Historical Provider, MD        Allergies:  Ciprofloxacin     Review of Systems:   · Constitutional: there has been no unanticipated weight loss. · Eyes: No vision changes  · ENT: No Headaches, no nasal congestion. No mouth sores or sore throat. · Cardiovascular: Reviewed in HPI  · Respiratory: No cough or wheezing, no sputum production. · Gastrointestinal: No abdominal pain, + occasional constipation or diarrhea  · Genitourinary: No dysuria, trouble voiding, or hematuria. · Musculoskeletal:  No weakness, + joint complaints. · Integumentary: No rash or pruritis. · Neurological: No numbness or tingling. No weakness.  No tremor. · Psychiatric: No anxiety, no depression. · Endocrine:  No excessive thirst or urination. · Hematologic/Lymphatic: No abnormal bruising or bleeding, blood clots or swollen lymph nodes. Physical Examination:    Vitals:    06/14/19 0941   BP: 114/64   Pulse: 50   SpO2: 94%   Weight: 187 lb (84.8 kg)   Height: 6' 1\" (1.854 m)        Constitutional and General Appearance: no apparent distress  HEENT: non-icteric sclera, oropharynx without exudate, oral mucosa moist  Neck: JVP less than 8 cm H20  Respiratory:  · No use of accessory muscles  · Clear breath sounds throughout, no wheezing, no crackles, no rhonchi  Cardiovascular:  · The apical impulses not displaced  · no murmur/rub/gallop  · Regular rate and irregular rhythm, S1,S2 normal  · Radial pulses 2+ and equal bilaterally  · No BLE edema  · Pedal Pulses: 2+ and equal   Abdomen:  · No masses or tenderness  · Liver: No Abnormalities Noted  Musculoskeletal/Skin:  · Walks with a cane.    · There is no clubbing, cyanosis of the extremities  · Skin is warm and dry  · Moves all extremities well  Neurological/Psychiatric:  · Alert and oriented in all spheres  · No abnormalities of mood, affect, memory, mentation, or behavior are noted    Lab Data:  CBC:   Lab Results   Component Value Date    WBC 6.0 06/08/2018    WBC 6.8 11/28/2016    WBC 7.1 11/27/2016    RBC 5.30 06/08/2018    RBC 3.75 11/28/2016    RBC 3.77 11/27/2016    HGB 16.5 06/08/2018    HGB 11.4 11/28/2016    HGB 11.3 11/27/2016    HCT 48.2 06/08/2018    HCT 33.8 11/28/2016    HCT 34.4 11/27/2016    MCV 91.0 06/08/2018    MCV 90.0 11/28/2016    MCV 91.3 11/27/2016    RDW 15.7 06/08/2018    RDW 13.6 11/28/2016    RDW 14.2 11/27/2016     06/08/2018     11/28/2016     11/27/2016     Iron: No results found for: IRON, TIBC, FERRITIN  BMP:   Lab Results   Component Value Date     04/25/2019     10/16/2018     08/07/2018    K 4.5 04/25/2019    K 4.3 10/16/2018    K is not excluded. Rodrigo Beniteztler correlation recommended.     Stress test 5/7/18  Abnormal high risk myocardial perfusion study. Sloan Cerise is a small area of ischemia within the posterior-lateral segment. Sloan Cerise is an additional area of possible ischemia within the basal anterior  wall.  The estimated left ventricular function is 27%.  The left ventricular size is moderately dilated.     Cardiac Cath 6/8/18  1.  Successful PCI of distal LAD with Xience Alpine drug-eluting stent. 2.  High-grade disease of ostial Ramus, ostial Diagonal 1, Diagonal 2 and  Diagonal 3 branches. 3.  Normal left ventricular filling pressure. 4.  Mildly elevated right-sided filling pressures. 5.  PA, PCWP, and PVR suggest that the pulmonary hypertension is due to  pulmonary disease. 6.  Mildly reduced cardiac index. 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 Ischemic cardiomyopathy    2. Chronic combined systolic and diastolic congestive heart failure ([address]    3. Coronary artery disease involving native coronary artery of native heart without angina pectoris    4. Essential hypertension        Plan: he is doing well, appears euvolemic. 1. Continue daily weights; continue to take your water pills as your are taking  2. No changes to medications; off of toprol due to bradycardia  3. Check BMP in about October   3. Follow up with your family doctor   4. Follow up 6 months    QUALITY MEASURES  1. Tobacco Cessation Counseling: NA  2. Retake of BP if >140/90:   NA  3. Documentation to PCP/referring for new patient:  Sent to PCP at close of office visit  4. CAD patient on anti-platelet: Yes  5. CAD patient on STATIN therapy:  Yes  6.  Patient with CHF and aFib on anticoagulation:  NA     I

## 2019-06-14 NOTE — PATIENT INSTRUCTIONS
[address]   Cardiac Follow-up    Primary Care Doctor:  Jordan Paredes MD    Chief Complaint   Patient presents with    6 Month Follow-Up    Congestive Heart Failure     EF 30-35%    Cardiomyopathy    Coronary Artery Disease    Hypertension    Discuss Labs     bmp 04/25/2019    Shortness of Breath     same    Fatigue        History of Present Illness:   I had the pleasure of seeing Sayra Young in follow up for cardiomyopathy. Echo at [facility] 1/2018 showed EF of 35-40%, mild pulmonary HTN. Started toprol 25mg daily and lisinopril 4/2018 for cardiomyopathy  Stress test was abnormal and then underwent cardiac cath and PCI of distal LAD for abnormal FFR with Xience Alpine drug-eluting stent. He also had high-grade disease of ostial Ramus, ostial Diagonal 1, Diagonal 2 and Diagonal 3 branches. Since last visit, he had echo showing improved LVEF 35-40%. Home weight is staying about 180lbs. He is doing well. Not walking as much as before as he is a little unsteady on his feet. No dizziness. No falling. Continues on lisinopril 15mg daily. Labs from April were stable. Issues with vision. No edema. Not coughing. Trying to drink more water. Taking lasix 2 tabs about 1-2 times a week. Sayra Young describes symptoms including fatigue but denies chest pain, palpitations, orthopnea, syncope. NYHA:   II-III  ACC/ AHA Stage:    C    Past Medical History:   has a past medical history of Acid reflux, Arthritis, CHF (congestive heart failure) ([address] Hypertension, Neuropathy, and Prostate cancer ([address] Surgical History:   has a past surgical history that includes hernia repair (2010); Total knee arthroplasty (Left, 3/21/16); joint replacement; knee surgery; and Total knee arthroplasty (Right, 11/25/2016). Social History:   reports that he quit smoking about 69 years ago. His smoking use included pipe. He quit after 5.00 years of use.  He has never used smokeless tobacco. He reports that he drinks alcohol. He reports that he does not use drugs. Family History:   Family History   Problem Relation Age of Onset    Other Brother         aorta replacement - heart failure    Pacemaker Brother     Heart Disease Brother        Home Medications:  Prior to Admission medications    Medication Sig Start Date End Date Taking? Authorizing Provider   atorvastatin (LIPITOR) 40 MG tablet Take 1 tablet by mouth nightly 4/23/19   [facility], APRN - CNP   clopidogrel (PLAVIX) 75 MG tablet Take 1 tablet by mouth daily 4/23/19   [facility], APRN - CNP   furosemide (LASIX) 20 MG tablet Take 1 tablet by mouth See Admin Instructions 1 tab three times a week 4/23/19   [facility], APRN - CNP   lisinopril (PRINIVIL;ZESTRIL) 10 MG tablet Take 1.5 tablets by mouth daily 4/23/19   [facility], APRN - CNP   magnesium oxide (MAG-OX) 400 MG tablet Take 1 tablet by mouth daily 4/23/19   [facility], APRN - CNP   nitroGLYCERIN (NITROSTAT) 0.4 MG SL tablet Place 1 tablet under the tongue every 5 minutes as needed for Chest pain 6/10/18   Iveth Pringle MD   aspirin 81 MG chewable tablet Take 1 tablet by mouth daily 6/10/18   PEYMAN Snyder - CNP   esomeprazole Magnesium (NEXIUM) 20 MG PACK Take 20 mg by mouth daily Pt takes every other day    Historical Provider, MD        Allergies:  Ciprofloxacin     Review of Systems:   · Constitutional: there has been no unanticipated weight loss. · Eyes: No vision changes  · ENT: No Headaches, no nasal congestion. No mouth sores or sore throat. · Cardiovascular: Reviewed in HPI  · Respiratory: No cough or wheezing, no sputum production. · Gastrointestinal: No abdominal pain, + occasional constipation or diarrhea  · Genitourinary: No dysuria, trouble voiding, or hematuria. · Musculoskeletal:  No weakness, + joint complaints. · Integumentary: No rash or pruritis. · Neurological: No numbness or tingling. No weakness.  No tremor. · Psychiatric: No anxiety, no depression. · Endocrine:  No excessive thirst or urination. · Hematologic/Lymphatic: No abnormal bruising or bleeding, blood clots or swollen lymph nodes. Physical Examination:    Vitals:    06/14/19 0941   BP: 114/64   Pulse: 50   SpO2: 94%   Weight: 187 lb (84.8 kg)   Height: 6' 1\" (1.854 m)        Constitutional and General Appearance: no apparent distress  HEENT: non-icteric sclera, oropharynx without exudate, oral mucosa moist  Neck: JVP less than 8 cm H20  Respiratory:  · No use of accessory muscles  · Clear breath sounds throughout, no wheezing, no crackles, no rhonchi  Cardiovascular:  · The apical impulses not displaced  · no murmur/rub/gallop  · Regular rate and irregular rhythm, S1,S2 normal  · Radial pulses 2+ and equal bilaterally  · No BLE edema  · Pedal Pulses: 2+ and equal   Abdomen:  · No masses or tenderness  · Liver: No Abnormalities Noted  Musculoskeletal/Skin:  · Walks with a cane.    · There is no clubbing, cyanosis of the extremities  · Skin is warm and dry  · Moves all extremities well  Neurological/Psychiatric:  · Alert and oriented in all spheres  · No abnormalities of mood, affect, memory, mentation, or behavior are noted    Lab Data:  CBC:   Lab Results   Component Value Date    WBC 6.0 06/08/2018    WBC 6.8 11/28/2016    WBC 7.1 11/27/2016    RBC 5.30 06/08/2018    RBC 3.75 11/28/2016    RBC 3.77 11/27/2016    HGB 16.5 06/08/2018    HGB 11.4 11/28/2016    HGB 11.3 11/27/2016    HCT 48.2 06/08/2018    HCT 33.8 11/28/2016    HCT 34.4 11/27/2016    MCV 91.0 06/08/2018    MCV 90.0 11/28/2016    MCV 91.3 11/27/2016    RDW 15.7 06/08/2018    RDW 13.6 11/28/2016    RDW 14.2 11/27/2016     06/08/2018     11/28/2016     11/27/2016     Iron: No results found for: IRON, TIBC, FERRITIN  BMP:   Lab Results   Component Value Date     04/25/2019     10/16/2018     08/07/2018    K 4.5 04/25/2019    K 4.3 10/16/2018    K 4.4 08/07/2018     04/25/2019     10/16/2018     08/07/2018    CO2 30 04/25/2019    CO2 32 10/16/2018    CO2 31 08/07/2018    BUN 30 04/25/2019    BUN 27 10/16/2018    BUN 23 08/07/2018    CREATININE 1.2 04/25/2019    CREATININE 1.3 10/16/2018    CREATININE 1.2 08/07/2018     BNP: No results found for: PROBNP  Lipids:   Lab Results   Component Value Date    CHOL 176 06/08/2018        Lab Results   Component Value Date    TRIG 66 06/08/2018        Lab Results   Component Value Date     (H) 06/08/2018        Lab Results   Component Value Date    LDLCALC 61 06/08/2018        Lab Results   Component Value Date    LABVLDL 13 06/08/2018      No results found for: CHOLHDLRATIO    EF:   Lab Results   Component Value Date    LVEF 33 06/09/2018       Recent Testing:            Echo 6/9/18  The left ventricular systolic function is moderately reduced with an ejection fraction of 30 -35 %. There is hypokinesis of the inferolateral, anterior and inferior walls. There is akinesis of the basal anterior and mid inferoseptum walls. Mild concentric left ventricular hypertrophy. Left ventricular diastolic filling pressure is elevated. Mild mitral and pulmonic regurgitation. Moderate bi-atrial enlargement. The right ventricle is mildly enlarged. Right ventricular systolic function is mildly reduced . Moderate tricuspid regurgitation. Systolic pulmonic artery pressure (SPAP) is estimated at 53 mmHg consistent with mild pulmonary hypertension (Right atrial pressure of 3mmHg). The pulmonic valve is not well visualized.     CT Chest 5/31/18  There is a large hiatal hernia containing the entire stomach as well as a portion transverse colon and pancreas.  This causes mass effect on the left lower lung with partial collapse of the left lower lobe.   There is a trace left pleural effusion and mild left basilar airspace disease. While this is likely related to mass effect from the hiatal hernia, mild pneumonia is not excluded. Nelwyn Comes correlation recommended.     Stress test 5/7/18  Abnormal high risk myocardial perfusion study. Michael Davis is a small area of ischemia within the posterior-lateral segment. Michael Davis is an additional area of possible ischemia within the basal anterior  wall.  The estimated left ventricular function is 27%.  The left ventricular size is moderately dilated.     Cardiac Cath 6/8/18  1.  Successful PCI of distal LAD with Xience Alpine drug-eluting stent. 2.  High-grade disease of ostial Ramus, ostial Diagonal 1, Diagonal 2 and  Diagonal 3 branches. 3.  Normal left ventricular filling pressure. 4.  Mildly elevated right-sided filling pressures. 5.  PA, PCWP, and PVR suggest that the pulmonary hypertension is due to  pulmonary disease. 6.  Mildly reduced cardiac index. 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 Ischemic cardiomyopathy    2. Chronic combined systolic and diastolic congestive heart failure ([address]    3. Coronary artery disease involving native coronary artery of native heart without angina pectoris    4. Essential hypertension        Plan: he is doing well, appears euvolemic. 1. Continue daily weights; continue to take your water pills as your are taking  2. No changes to medications; off of toprol due to bradycardia  3. Check BMP in about October   3. Follow up with your family doctor   4. Follow up 6 months    QUALITY MEASURES  1. Tobacco Cessation Counseling: NA  2. Retake of BP if >140/90:   NA  3. Documentation to PCP/referring for new patient:  Sent to PCP at close of office visit  4. CAD patient on anti-platelet: Yes  5. CAD patient on STATIN therapy:  Yes  6.  Patient with CHF and aFib on anticoagulation:  NA     I appreciate the opportunity for caring for this patient.      Evan Lawson CNP, 6/14/2019, 3:46 PM

## 2019-06-18 RX ORDER — LISINOPRIL 10 MG/1
TABLET ORAL
Qty: 90 TABLET | Refills: 1 | Status: SHIPPED | OUTPATIENT
Start: 2019-06-18

## 2019-12-19 LAB
BUN BLDV-MCNC: 36 MG/DL
CALCIUM SERPL-MCNC: 9.8 MG/DL
CHLORIDE BLD-SCNC: 103 MMOL/L
CO2: 33 MMOL/L
CREAT SERPL-MCNC: 1.6 MG/DL
GFR CALCULATED: 41
GLUCOSE BLD-MCNC: 106 MG/DL
POTASSIUM SERPL-SCNC: 4 MMOL/L
SODIUM BLD-SCNC: 143 MMOL/L

## 2019-12-26 ENCOUNTER — TELEPHONE (OUTPATIENT)
Dept: CARDIOLOGY CLINIC | Age: 84
End: 2019-12-26